# Patient Record
Sex: FEMALE | Race: WHITE | Employment: UNEMPLOYED
[De-identification: names, ages, dates, MRNs, and addresses within clinical notes are randomized per-mention and may not be internally consistent; named-entity substitution may affect disease eponyms.]

---

## 2017-06-03 ENCOUNTER — HEALTH MAINTENANCE LETTER (OUTPATIENT)
Age: 32
End: 2017-06-03

## 2018-04-30 ENCOUNTER — OFFICE VISIT (OUTPATIENT)
Dept: FAMILY MEDICINE | Facility: CLINIC | Age: 33
End: 2018-04-30
Payer: COMMERCIAL

## 2018-04-30 VITALS
HEART RATE: 44 BPM | SYSTOLIC BLOOD PRESSURE: 110 MMHG | DIASTOLIC BLOOD PRESSURE: 69 MMHG | WEIGHT: 124 LBS | RESPIRATION RATE: 18 BRPM | TEMPERATURE: 96.7 F | BODY MASS INDEX: 21.28 KG/M2

## 2018-04-30 DIAGNOSIS — G44.219 EPISODIC TENSION-TYPE HEADACHE, NOT INTRACTABLE: Primary | ICD-10-CM

## 2018-04-30 PROCEDURE — 99213 OFFICE O/P EST LOW 20 MIN: CPT | Performed by: NURSE PRACTITIONER

## 2018-04-30 RX ORDER — SUMATRIPTAN 50 MG/1
50 TABLET, FILM COATED ORAL
Qty: 9 TABLET | Refills: 1 | Status: SHIPPED | OUTPATIENT
Start: 2018-04-30 | End: 2018-07-15

## 2018-04-30 NOTE — PROGRESS NOTES
SUBJECTIVE:   Damari Villalta is a 32 year old female who presents to clinic today for the following health issues:        Headaches      Duration: on going for years     Description  Location: bilateral in the frontal area   Character: throbbing pain      Intensity:  severe    Accompanying signs and symptoms:    Precipitating or Alleviating factors:  Nausea/vomiting: usually  Dizziness: no  Weakness or numbness: no  Visual changes: none  Fever: no   Sinus or URI symptoms no     History  Head trauma: no  Family history of migraines: no  Previous tests for headaches: no  Neurologist evaluations: no   Able to do daily activities when headache present: YES  Wake with headaches: YES  Daily pain medication use: no    Usually has 3-4 episodes of headaches per month.    Most last between 1-3 days a few were up to 7 days.    Rates pain +7/10 and usually can work through the pain but recently has not been able to work.    Feels squeezing in the front.   Sometimes ibuprofen helps.   Tried fioricet in the past did not help at all.   Has a high deductible insurance plan.  Want to avoid seeing a specialist if at all possible.       Problem list and histories reviewed & adjusted, as indicated.  Additional history: as documented    Reviewed and updated as needed this visit by clinical staff  Tobacco  Allergies  Meds  Problems  Med Hx  Surg Hx  Fam Hx  Soc Hx        Reviewed and updated as needed this visit by Provider  Tobacco  Allergies  Meds  Problems  Med Hx  Surg Hx  Fam Hx  Soc Hx          ROS:  Constitutional, HEENT, cardiovascular, pulmonary, gi and gu systems are negative, except as otherwise noted.    OBJECTIVE:     /69  Pulse (!) 44  Temp 96.7  F (35.9  C) (Oral)  Resp 18  Wt 124 lb (56.2 kg)  BMI 21.28 kg/m2  Body mass index is 21.28 kg/(m^2).  GENERAL: healthy, alert and no distress  EYES: Eyes grossly normal to inspection, PERRL and conjunctivae and sclerae normal  HENT: ear canals and  TM's normal, nose and mouth without ulcers or lesions  NECK: no adenopathy, no asymmetry, masses, or scars and thyroid normal to palpation  RESP: lungs clear to auscultation - no rales, rhonchi or wheezes  CV: regular rate and rhythm, normal S1 S2, no S3 or S4, no murmur, click or rub, no peripheral edema and peripheral pulses strong  MS: no gross musculoskeletal defects noted, no edema  NEURO: Normal strength and tone, sensory exam grossly normal, mentation intact and cranial nerves 2-12 intact      ASSESSMENT/PLAN:       ICD-10-CM    1. Episodic tension-type headache, not intractable G44.219 SUMAtriptan (IMITREX) 50 MG tablet     Discussed  headaches, triggers, and various treatments, including immediate acting, mainly the triptans, and prophylaxis and indications for each. Gone over benefits and risks, as well as side effects of medications, as well as instructions. Patient education handout given.  F/u if persists or worsens.  May consider neuro referral.      See Patient Instructions    BAYLEE Willard CNP  [facility]

## 2018-04-30 NOTE — Clinical Note
Please abstract the following data from this visit with this patient into the appropriate field in Epic:  Pap smear done on this date: 2015 (approximately), by this group: [facility] , results were normal.

## 2018-04-30 NOTE — MR AVS SNAPSHOT
After Visit Summary   4/30/2018    Damari DE LOS SANTOS Metzdorff    MRN: [id number]           Patient Information     Date Of Birth          1985        Visit Information        Provider Department      4/30/2018 10:40 AM Lydia Maurice APRN CNP [facility]        Today's Diagnoses     Episodic tension-type headache, not intractable    -  1       Follow-ups after your visit        Who to contact     If you have questions or need follow up information about today's clinic visit or your schedule please contact [facility] directly at [phone].  Normal or non-critical lab and imaging results will be communicated to you by OneMobhart, letter or phone within 4 business days after the clinic has received the results. If you do not hear from us within 7 days, please contact the clinic through OneMobhart or phone. If you have a critical or abnormal lab result, we will notify you by phone as soon as possible.  Submit refill requests through TotalTakeout or call your pharmacy and they will forward the refill request to us. Please allow 3 business days for your refill to be completed.          Additional Information About Your Visit        MyChart Information     TotalTakeout gives you secure access to your electronic health record. If you see a primary care provider, you can also send messages to your care team and make appointments. If you have questions, please call your primary care clinic.  If you do not have a primary care provider, please call [phone] and they will assist you.        Care EveryWhere ID     This is your Care EveryWhere ID. This could be used by other organizations to access your [facility] medical records  [id number]        Your Vitals Were     Pulse Temperature Respirations BMI (Body Mass Index)          44 96.7  F (35.9  C) (Oral) 18 21.28 kg/m2         Blood Pressure from Last 3 Encounters:   04/30/18 110/69   04/07/15 110/66   08/20/14 100/63    Weight  from Last 3 Encounters:   04/30/18 124 lb (56.2 kg)   04/07/15 131 lb (59.4 kg)   08/20/14 128 lb 6.4 oz (58.2 kg)              Today, you had the following     No orders found for display         Today's Medication Changes          These changes are accurate as of 4/30/18 11:04 AM.  If you have any questions, ask your nurse or doctor.               Start taking these medicines.        Dose/Directions    SUMAtriptan 50 MG tablet   Commonly known as:  IMITREX   Used for:  Episodic tension-type headache, not intractable   Started by:  Lydia Maurice, APRN CNP        Dose:  50 mg   Take 1 tablet (50 mg) by mouth at onset of headache for migraine May repeat in 2 hours. Max 4 tablets/24 hours.   Quantity:  9 tablet   Refills:  1            Where to get your medicines      These medications were sent to [facility] - [place], [place] - [address]  [address], [place] [address]     Phone:  [phone]     SUMAtriptan 50 MG tablet                Primary Care Provider Office Phone # Fax #    BAYLEE Willard [phone]       [address] [place] [address]        Equal Access to Services     CADEN BORJAS AH: Connie paintero Soluliali, waaxda luqadaha, qaybta kaalmada adeegyada, shaina mancini. So [facility] [phone].    ATENCIÓN: Si habla español, tiene a lindsay disposición servicios gratuitos de asistencia lingüística. Llame al [phone].    We comply with applicable federal civil rights laws and [place] laws. We do not discriminate on the basis of race, color, national origin, age, disability, sex, sexual orientation, or gender identity.            Thank you!     Thank you for choosing [facility]  for your care. Our goal is always to provide you with excellent care. Hearing back from our patients is one way we can continue to improve our services. Please take a few minutes to complete the written survey  that you may receive in the mail after your visit with us. Thank you!             Your Updated Medication List - Protect others around you: Learn how to safely use, store and throw away your medicines at www.disposemymeds.org.          This list is accurate as of 4/30/18 11:04 AM.  Always use your most recent med list.                   Brand Name Dispense Instructions for use Diagnosis    butalbital-acetaminophen-caffeine -40 MG per tablet    FIORICET/ESGIC    28 tablet    Take 1 tablet by mouth every 4 hours as needed    Headache(784.0)       desogestrel-ethinyl estradiol 0.15-30 MG-MCG per tablet    APRI    3 Package    Take 1 tablet by mouth daily    Contraception management       SUMAtriptan 50 MG tablet    IMITREX    9 tablet    Take 1 tablet (50 mg) by mouth at onset of headache for migraine May repeat in 2 hours. Max 4 tablets/24 hours.    Episodic tension-type headache, not intractable

## 2018-05-05 ENCOUNTER — HEALTH MAINTENANCE LETTER (OUTPATIENT)
Age: 33
End: 2018-05-05

## 2018-07-15 ENCOUNTER — MYC REFILL (OUTPATIENT)
Dept: FAMILY MEDICINE | Facility: CLINIC | Age: 33
End: 2018-07-15

## 2018-07-15 DIAGNOSIS — G44.219 EPISODIC TENSION-TYPE HEADACHE, NOT INTRACTABLE: ICD-10-CM

## 2018-07-17 RX ORDER — SUMATRIPTAN 50 MG/1
50 TABLET, FILM COATED ORAL
Qty: 9 TABLET | Refills: 1 | Status: SHIPPED | OUTPATIENT
Start: 2018-07-17 | End: 2018-09-27

## 2018-07-17 NOTE — TELEPHONE ENCOUNTER
Message from Tytot:  Original authorizing provider: BAYLEE Willard CNP would like a refill of the following medications:  SUMAtriptan (IMITREX) 50 MG tablet [BAYLEE Willard CNP]    Preferred pharmacy: [facility] - [place], [place] - [address]    Comment:  I hoping to get a renewal on the Sumatriptan prescription. It has worked very well. I have been able to keep the headaches away for the most part or they only are mild. I haven't had to miss hours of work to recover and the nausea doesn't rear it's head.

## 2018-09-11 ENCOUNTER — OFFICE VISIT (OUTPATIENT)
Dept: FAMILY MEDICINE | Facility: CLINIC | Age: 33
End: 2018-09-11
Payer: COMMERCIAL

## 2018-09-11 VITALS
OXYGEN SATURATION: 99 % | BODY MASS INDEX: 21.77 KG/M2 | TEMPERATURE: 97.4 F | RESPIRATION RATE: 16 BRPM | DIASTOLIC BLOOD PRESSURE: 71 MMHG | WEIGHT: 126.8 LBS | SYSTOLIC BLOOD PRESSURE: 114 MMHG | HEART RATE: 45 BPM

## 2018-09-11 DIAGNOSIS — R51.9 CHRONIC DAILY HEADACHE: Primary | ICD-10-CM

## 2018-09-11 PROCEDURE — 99214 OFFICE O/P EST MOD 30 MIN: CPT | Performed by: FAMILY MEDICINE

## 2018-09-11 RX ORDER — CYCLOBENZAPRINE HCL 10 MG
5-10 TABLET ORAL 3 TIMES DAILY PRN
Qty: 15 TABLET | Refills: 1 | Status: SHIPPED | OUTPATIENT
Start: 2018-09-11 | End: 2019-01-31

## 2018-09-11 RX ORDER — FLURBIPROFEN 50 MG
50 TABLET ORAL 2 TIMES DAILY
Qty: 28 TABLET | Refills: 0 | Status: SHIPPED | OUTPATIENT
Start: 2018-09-11 | End: 2019-01-31

## 2018-09-11 NOTE — PROGRESS NOTES
"  SUBJECTIVE:   Damari Villalta is a 32 year old female who presents to clinic today for the following health issues:        Headaches      Duration: saturday    Description  Location: bilateral in the frontal area   Character: sharp pain  Frequency:  1 to 2 times a week  Duration:  All day    Intensity:  severe    Accompanying signs and symptoms:    Precipitating or Alleviating factors:  Nausea/vomiting: occasionally  Dizziness: occasionally  Weakness or numbness: occasionally  Visual changes: none  Fever: no   Sinus or URI symptoms no     History  Head trauma: no   Family history of migraines: YES      Usually, pain is bilateral in her forehead and around her eyes, but today the pain is more on the left side.  She does not know how to characterize the pain.  She was prescribed imitrex at her last PCP visit a few months ago, and it has been a \"wonder drug\" for her, at least initially.  Now, it seems to be helping less and did not help stop this headache, which is ongong for the past 4 days.  She also takes ibuprofen for her headaches.  She takes something for headaches more than a couple of times per week.  She denies any allergy or sinus symptoms.  She denies any aura.  She usually is able to work through a headache, but sometimes does have to go and lie down.  Sleeping does not necessarily help.  Pain does not wake her up from sleep. No fevers, chills, weight loss, neck stiffness, rash, numbness, tingling, weakness or speech changes.  Typical triggers include dehydration, alcohol, skipping a meal, not enough sleep, too much stress, neck and shoulder tension.  She runs for exercise; running does not cause pain, but if she gets dehydrated, it can trigger a headache.  Overall healthy lifestyle.  She did try stopping her OCP, but this did not change headaches.   In the past, fioricet has not helped headaches.          Problem list and histories reviewed & adjusted, as indicated.  Additional history: as " documented    Patient Active Problem List   Diagnosis     CARDIOVASCULAR SCREENING; LDL GOAL LESS THAN 160     Encounter for other general counseling or advice on contraception     Past Surgical History:   Procedure Laterality Date     TONSILLECTOMY & ADENOIDECTOMY         Social History   Substance Use Topics     Smoking status: Never Smoker     Smokeless tobacco: Never Used     Alcohol use Yes      Comment: social, 3 drinks weekly     Family History   Problem Relation Age of Onset     Hypertension Maternal Grandmother      Hypertension Maternal Grandfather      Hypertension Paternal Grandmother      Hypertension Paternal Grandfather          Current Outpatient Prescriptions   Medication Sig Dispense Refill     cyclobenzaprine (FLEXERIL) 10 MG tablet Take 0.5-1 tablets (5-10 mg) by mouth 3 times daily as needed for muscle spasms 15 tablet 1     flurbiprofen (ANSAID) 50 MG tablet Take 1 tablet (50 mg) by mouth 2 times daily for 14 days 28 tablet 0     SUMAtriptan (IMITREX) 50 MG tablet Take 1 tablet (50 mg) by mouth at onset of headache for migraine May repeat in 2 hours. Max 4 tablets/24 hours. 9 tablet 1     Allergies   Allergen Reactions     Nkda [No Known Drug Allergies]        Reviewed and updated as needed this visit by clinical staff       Reviewed and updated as needed this visit by Provider         ROS:  Constitutional, HEENT, cardiovascular, pulmonary, GI, , musculoskeletal, neuro, skin, endocrine and psych systems are negative, except as otherwise noted.    OBJECTIVE:     /71  Pulse (!) 45  Temp 97.4  F (36.3  C) (Oral)  Resp 16  Wt 126 lb 12.8 oz (57.5 kg)  SpO2 99%  BMI 21.77 kg/m2  Body mass index is 21.77 kg/(m^2).  GENERAL APPEARANCE: healthy, alert and no distress  EYES: Eyes grossly normal to inspection, PERRL and conjunctivae and sclerae normal  HENT: ear canals and TM's normal and nose and mouth without ulcers or lesions  NECK: no adenopathy, no asymmetry, masses, or scars and  "thyroid normal to palpation  RESP: lungs clear to auscultation - no rales, rhonchi or wheezes  CV: regular rates and rhythm, normal S1 S2, no S3 or S4 and no murmur, click or rub  NEURO: Normal strength and tone, mentation intact and speech normal  CN II: visual fields intact to confrontation  CN III, IV, VI: EOM intact, pupils equal and reactive  CN V: facial sensation nl  CN VII: face symmetric, no facial droop  CN VIII: hearing normal to conversation  CN IX: palate elevation symmetric, uvula at midline  CN XI SCM normal, shoulder shrug nl  CN XII: tongue midline  Normal gait  DTRs symmetric  Normal finger-to-nose  Negative Rhomberg.  PSYCH: mentation appears normal and affect normal/bright        ASSESSMENT/PLAN:             1. Chronic daily headache  I think there is likely a component of rebound headache that has caused a worsening of her symptoms.  I have recommended to take a long-acting anti-inflammatory, and to use flexeril and/or the imitrex if an acute headache begins.  Advised to stay well-hydrated, get regular sleep and take steps to manage stress.  If headaches are not improving, would consider neuroimaging and referral to neurology.  Discussed to limit use of imitrex and over the counter pain relievers to no more than twice per week.  She has such frequent headaches, she may consider a preventative medication (obviously not a BB or CCB given low resting pulse due to athleticism), but I am not suggesting she do this at this time.  Her neurologic exam today is nonfocal, and there are no \"red flag\" symptoms that I think warrant urgent imaging.    - flurbiprofen (ANSAID) 50 MG tablet; Take 1 tablet (50 mg) by mouth 2 times daily for 14 days  Dispense: 28 tablet; Refill: 0  - cyclobenzaprine (FLEXERIL) 10 MG tablet; Take 0.5-1 tablets (5-10 mg) by mouth 3 times daily as needed for muscle spasms  Dispense: 15 tablet; Refill: 1    F/u after 2 weeks of treatment to discuss taper vs stopping flurbiprofen, " sooner if needed.      Josette Alvarez MD  [facility]

## 2018-09-11 NOTE — MR AVS SNAPSHOT
After Visit Summary   9/11/2018    Damari DE LOS SANTOS Metzdorff    MRN: [id number]           Patient Information     Date Of Birth          1985        Visit Information        Provider Department      9/11/2018 9:00 AM Josette Alvarez MD [facility]        Today's Diagnoses     Chronic daily headache    -  1      Care Instructions    Start flurbiprofen twice daily with food for 2 weeks.  Let us know how you do.  If you get a headache, take your sumatriptan plus or minus flexeril.               Follow-ups after your visit        Who to contact     If you have questions or need follow up information about today's clinic visit or your schedule please contact [facility] directly at [phone].  Normal or non-critical lab and imaging results will be communicated to you by [employer] Grouphart, letter or phone within 4 business days after the clinic has received the results. If you do not hear from us within 7 days, please contact the clinic through [employer] or phone. If you have a critical or abnormal lab result, we will notify you by phone as soon as possible.  Submit refill requests through [employer] Snowboarding or call your pharmacy and they will forward the refill request to us. Please allow 3 business days for your refill to be completed.          Additional Information About Your Visit        MyChart Information     [employer] gives you secure access to your electronic health record. If you see a primary care provider, you can also send messages to your care team and make appointments. If you have questions, please call your primary care clinic.  If you do not have a primary care provider, please call [phone] and they will assist you.        Care EveryWhere ID     This is your Care EveryWhere ID. This could be used by other organizations to access your [facility] medical records  [id number]        Your Vitals Were     Pulse Temperature Respirations Pulse Oximetry BMI (Body Mass Index)        45 97.4  F (36.3  C) (Oral) 16 99% 21.77 kg/m2        Blood Pressure from Last 3 Encounters:   09/11/18 114/71   04/30/18 110/69   04/07/15 110/66    Weight from Last 3 Encounters:   09/11/18 126 lb 12.8 oz (57.5 kg)   04/30/18 124 lb (56.2 kg)   04/07/15 131 lb (59.4 kg)              Today, you had the following     No orders found for display         Today's Medication Changes          These changes are accurate as of 9/11/18  9:24 AM.  If you have any questions, ask your nurse or doctor.               Start taking these medicines.        Dose/Directions    cyclobenzaprine 10 MG tablet   Commonly known as:  FLEXERIL   Used for:  Chronic daily headache   Started by:  Josette Alvarez MD        Dose:  5-10 mg   Take 0.5-1 tablets (5-10 mg) by mouth 3 times daily as needed for muscle spasms   Quantity:  15 tablet   Refills:  1       flurbiprofen 50 MG tablet   Commonly known as:  ANSAID   Used for:  Chronic daily headache   Started by:  Josette Alvarez MD        Dose:  50 mg   Take 1 tablet (50 mg) by mouth 2 times daily for 14 days   Quantity:  28 tablet   Refills:  0            Where to get your medicines      These medications were sent to [facility] - [place], [place] - [address]  [address], [place] [address]     Phone:  [phone]     cyclobenzaprine 10 MG tablet    flurbiprofen 50 MG tablet                Primary Care Provider Office Phone # Fax #    BAYLEE Willard [facility] [phone]       [address] [place] [address]        Equal Access to Services     BIBI BORJAS AH: Hadashli Ferro, gregory luoralia, qaybta kaalmada yong, shaina mancini. So [facility] [phone].    ATENCIÓN: Si habla español, tiene a lindsay disposición servicios gratuitos de asistencia lingüística. Llame al [phone].    We comply with applicable federal civil rights laws and [place] laws. We do not discriminate on the  basis of race, color, national origin, age, disability, sex, sexual orientation, or gender identity.            Thank you!     Thank you for choosing [facility]  for your care. Our goal is always to provide you with excellent care. Hearing back from our patients is one way we can continue to improve our services. Please take a few minutes to complete the written survey that you may receive in the mail after your visit with us. Thank you!             Your Updated Medication List - Protect others around you: Learn how to safely use, store and throw away your medicines at www.disposemymeds.org.          This list is accurate as of 9/11/18  9:24 AM.  Always use your most recent med list.                   Brand Name Dispense Instructions for use Diagnosis    cyclobenzaprine 10 MG tablet    FLEXERIL    15 tablet    Take 0.5-1 tablets (5-10 mg) by mouth 3 times daily as needed for muscle spasms    Chronic daily headache       flurbiprofen 50 MG tablet    ANSAID    28 tablet    Take 1 tablet (50 mg) by mouth 2 times daily for 14 days    Chronic daily headache       SUMAtriptan 50 MG tablet    IMITREX    9 tablet    Take 1 tablet (50 mg) by mouth at onset of headache for migraine May repeat in 2 hours. Max 4 tablets/24 hours.    Episodic tension-type headache, not intractable

## 2018-09-11 NOTE — PATIENT INSTRUCTIONS
Start flurbiprofen twice daily with food for 2 weeks.  Let us know how you do.  If you get a headache, take your sumatriptan plus or minus flexeril.

## 2018-09-27 ENCOUNTER — MYC REFILL (OUTPATIENT)
Dept: FAMILY MEDICINE | Facility: CLINIC | Age: 33
End: 2018-09-27

## 2018-09-27 DIAGNOSIS — G44.219 EPISODIC TENSION-TYPE HEADACHE, NOT INTRACTABLE: ICD-10-CM

## 2018-09-27 RX ORDER — SUMATRIPTAN 50 MG/1
50 TABLET, FILM COATED ORAL
Qty: 9 TABLET | Refills: 1 | Status: SHIPPED | OUTPATIENT
Start: 2018-09-27 | End: 2018-11-17

## 2018-09-27 NOTE — TELEPHONE ENCOUNTER
Message from Atonometricst:  Original authorizing provider: BAYLEE Willard CNP MAGALIAnabella Villalta would like a refill of the following medications:  SUMAtriptan (IMITREX) 50 MG tablet [BAYLEE Willard CNP]    Preferred pharmacy: [facility] - [place], [place] - [address]    Comment:  I hoping to get a renewal on Imitrex, it has been the best at keeping away the worst headaches. One exception since late April, 9/10-9/11 I was miserable and Imitrex wasn't helping. I saw Josette Alvarez and since I often have Ibuprofen she suggested I try a 2 week round of flurbiprophen in case it is rebound headaches. I haven't had Ibuprofen since and have done the round of FB but have had to take Imitrex twice to fight off a headache and am now out. Thank you for your time and help.

## 2018-11-17 ENCOUNTER — MYC REFILL (OUTPATIENT)
Dept: FAMILY MEDICINE | Facility: CLINIC | Age: 33
End: 2018-11-17

## 2018-11-17 DIAGNOSIS — G44.219 EPISODIC TENSION-TYPE HEADACHE, NOT INTRACTABLE: ICD-10-CM

## 2018-11-17 NOTE — TELEPHONE ENCOUNTER
Message from Vinet:  Original authorizing provider: BAYLEE Willard CNP would like a refill of the following medications:  SUMAtriptan (IMITREX) 50 MG tablet [BAYLEE Willard CNP]    Preferred pharmacy: [facility] - [place], [place] - [address]    Comment:  I hoping to get a renewal on the Sumatriptan prescription. It has worked very well.

## 2018-11-20 ENCOUNTER — MYC REFILL (OUTPATIENT)
Dept: FAMILY MEDICINE | Facility: CLINIC | Age: 33
End: 2018-11-20

## 2018-11-20 DIAGNOSIS — G44.219 EPISODIC TENSION-TYPE HEADACHE, NOT INTRACTABLE: ICD-10-CM

## 2018-11-20 RX ORDER — SUMATRIPTAN 50 MG/1
50 TABLET, FILM COATED ORAL
Qty: 9 TABLET | Refills: 1 | Status: CANCELLED | OUTPATIENT
Start: 2018-11-20

## 2018-11-20 RX ORDER — SUMATRIPTAN 50 MG/1
50 TABLET, FILM COATED ORAL
Qty: 9 TABLET | Refills: 1 | Status: SHIPPED | OUTPATIENT
Start: 2018-11-20 | End: 2018-12-27

## 2018-11-20 NOTE — TELEPHONE ENCOUNTER
Message from SitScapet:  Original authorizing provider: BAYLEE Willard CNP would like a refill of the following medications:  SUMAtriptan (IMITREX) 50 MG tablet [BAYLEE Willard CNP]    Preferred pharmacy: [facility] - [place], [place] - [address]    Comment:  I thought I submitted a request to refill this on Saturday but I am not unsure since I haven't heard back so I am trying again. Thanks for your time! Radha

## 2019-01-11 ENCOUNTER — OFFICE VISIT (OUTPATIENT)
Dept: FAMILY MEDICINE | Facility: CLINIC | Age: 34
End: 2019-01-11
Payer: COMMERCIAL

## 2019-01-11 VITALS
OXYGEN SATURATION: 100 % | DIASTOLIC BLOOD PRESSURE: 72 MMHG | RESPIRATION RATE: 16 BRPM | SYSTOLIC BLOOD PRESSURE: 99 MMHG | TEMPERATURE: 98.5 F | WEIGHT: 125 LBS | HEART RATE: 45 BPM | BODY MASS INDEX: 21.46 KG/M2

## 2019-01-11 DIAGNOSIS — R51.9 CHRONIC DAILY HEADACHE: ICD-10-CM

## 2019-01-11 DIAGNOSIS — Z01.419 WELL FEMALE EXAM WITH ROUTINE GYNECOLOGICAL EXAM: Primary | ICD-10-CM

## 2019-01-11 PROCEDURE — G0145 SCR C/V CYTO,THINLAYER,RESCR: HCPCS | Performed by: NURSE PRACTITIONER

## 2019-01-11 PROCEDURE — G0124 SCREEN C/V THIN LAYER BY MD: HCPCS | Performed by: NURSE PRACTITIONER

## 2019-01-11 PROCEDURE — 90471 IMMUNIZATION ADMIN: CPT | Performed by: NURSE PRACTITIONER

## 2019-01-11 PROCEDURE — 99395 PREV VISIT EST AGE 18-39: CPT | Mod: 25 | Performed by: NURSE PRACTITIONER

## 2019-01-11 PROCEDURE — 87624 HPV HI-RISK TYP POOLED RSLT: CPT | Performed by: NURSE PRACTITIONER

## 2019-01-11 PROCEDURE — 90715 TDAP VACCINE 7 YRS/> IM: CPT | Performed by: NURSE PRACTITIONER

## 2019-01-11 ASSESSMENT — ENCOUNTER SYMPTOMS
PARESTHESIAS: 0
ARTHRALGIAS: 0
NERVOUS/ANXIOUS: 0
MYALGIAS: 0
ABDOMINAL PAIN: 0
SHORTNESS OF BREATH: 0
HEMATURIA: 0
DYSURIA: 0
PALPITATIONS: 0
FREQUENCY: 0
HEMATOCHEZIA: 0
DIZZINESS: 0
SORE THROAT: 0
COUGH: 0
FEVER: 0
WEAKNESS: 0
EYE PAIN: 0
BREAST MASS: 0
HEARTBURN: 0
DIARRHEA: 0
NAUSEA: 0
CHILLS: 0
HEADACHES: 1
JOINT SWELLING: 0

## 2019-01-11 NOTE — PROGRESS NOTES
SUBJECTIVE:   CC: Damari Villalta is an 33 year old woman who presents for preventive health visit.     Physical   Annual:     Getting at least 3 servings of Calcium per day:  Yes    Bi-annual eye exam:  NO    Dental care twice a year:  NO    Sleep apnea or symptoms of sleep apnea:  None    Diet:  Regular (no restrictions)    Frequency of exercise:  4-5 days/week    Duration of exercise:  30-45 minutes    Taking medications regularly:  Yes    Medication side effects:  Not applicable    Additional concerns today:  Yes    PHQ-2 Total Score: 0      Today's PHQ-2 Score:   PHQ-2 ( 1999 [employer]) 1/11/2019   Q1: Little interest or pleasure in doing things 0   Q2: Feeling down, depressed or hopeless 0   PHQ-2 Score 0   Q1: Little interest or pleasure in doing things Not at all   Q2: Feeling down, depressed or hopeless Not at all   PHQ-2 Score 0       Abuse: Current or Past(Physical, Sexual or Emotional)- No  Do you feel safe in your environment? Yes    Social History     Tobacco Use     Smoking status: Never Smoker     Smokeless tobacco: Never Used   Substance Use Topics     Alcohol use: Yes     Comment: social, 3 drinks weekly     Alcohol Use 1/11/2019   If you drink alcohol do you typically have greater than 3 drinks per day OR greater than 7 drinks per week? No     Reviewed orders with patient.  Reviewed health maintenance and updated orders accordingly - Yes      Pertinent mammograms are reviewed under the imaging tab.  History of abnormal Pap smear: NO - age 30-65 PAP every 5 years with negative HPV co-testing recommended  PAP / HPV 7/24/2013   PAP NIL     Reviewed and updated as needed this visit by clinical staff         Reviewed and updated as needed this visit by Provider          Review of Systems   Constitutional: Negative for chills and fever.   HENT: Negative for congestion, ear pain, hearing loss and sore throat.    Eyes: Negative for pain and visual disturbance.   Respiratory: Negative for cough and  shortness of breath.    Cardiovascular: Negative for chest pain, palpitations and peripheral edema.   Gastrointestinal: Negative for abdominal pain, diarrhea, heartburn, hematochezia and nausea.   Breasts:  Negative for tenderness, breast mass and discharge.   Genitourinary: Negative for dysuria, frequency, genital sores, hematuria, pelvic pain, urgency, vaginal bleeding and vaginal discharge.   Musculoskeletal: Negative for arthralgias, joint swelling and myalgias.   Skin: Negative for rash.   Neurological: Positive for headaches. Negative for dizziness, weakness and paresthesias.   Psychiatric/Behavioral: Negative for mood changes. The patient is not nervous/anxious.        OBJECTIVE:   BP 99/72 (BP Location: Left arm, Patient Position: Sitting, Cuff Size: Adult Regular)   Pulse (!) 45   Temp 98.5  F (36.9  C) (Oral)   Resp 16   Wt 56.7 kg (125 lb)   LMP 12/21/2018 (Approximate)   SpO2 100%   BMI 21.46 kg/m    Physical Exam  GENERAL: healthy, alert and no distress  EYES: Eyes grossly normal to inspection, PERRL and conjunctivae and sclerae normal  HENT: ear canals and TM's normal, nose and mouth without ulcers or lesions  NECK: no adenopathy, no asymmetry, masses, or scars and thyroid normal to palpation  RESP: lungs clear to auscultation - no rales, rhonchi or wheezes  BREAST: normal without masses, tenderness or nipple discharge and no palpable axillary masses or adenopathy  CV: regular rate and rhythm, normal S1 S2, no S3 or S4, no murmur, click or rub, no peripheral edema and peripheral pulses strong  ABDOMEN: soft, nontender, no hepatosplenomegaly, no masses and bowel sounds normal   (female): normal female external genitalia, normal urethral meatus, vaginal mucosa pink, moist, well rugated, and normal cervix/adnexa/uterus without masses or discharge  MS: no gross musculoskeletal defects noted, no edema  SKIN: no suspicious lesions or rashes  NEURO: Normal strength and tone, mentation intact and  "speech normal  PSYCH: mentation appears normal, affect normal/bright      ASSESSMENT/PLAN:       ICD-10-CM    1. Well female exam with routine gynecological exam Z01.419 Pap imaged thin layer screen with HPV - recommended age 30 - 65 years (select HPV order below)     HPV High Risk Types DNA Cervical   2. Chronic daily headache R51 NEUROLOGY ADULT REFERRAL      well female, not fasting for labs.  Encouraged to continue exercise and healthy diet choices.      Almost daily headaches.    Tried muscle relaxant and fluribiprofen with no relief.    imitrex helps but is taking it 3-4 times a week.    Will refer to neurology for further eval of recurrent and increasing headaches.      COUNSELING:  Reviewed preventive health counseling, as reflected in patient instructions    BP Readings from Last 1 Encounters:   09/11/18 114/71     Estimated body mass index is 21.77 kg/m  as calculated from the following:    Height as of 4/7/15: 1.626 m (5' 4\").    Weight as of 9/11/18: 57.5 kg (126 lb 12.8 oz).         reports that  has never smoked. she has never used smokeless tobacco.      Counseling Resources:  ATP IV Guidelines  Pooled Cohorts Equation Calculator  Breast Cancer Risk Calculator  FRAX Risk Assessment  ICSI Preventive Guidelines  Dietary Guidelines for Americans, 2010  USDA's MyPlate  ASA Prophylaxis  Lung CA Screening    BAYLEE Willard CNP  [facility]  "

## 2019-01-11 NOTE — NURSING NOTE
Screening Questionnaire for Adult Immunization    Are you sick today?   No   Do you have allergies to medications, food, a vaccine component or latex?   No   Have you ever had a serious reaction after receiving a vaccination?   No   Do you have a long-term health problem with heart disease, lung disease, asthma, kidney disease, metabolic disease (e.g. diabetes), anemia, or other blood disorder?   No   Do you have cancer, leukemia, HIV/AIDS, or any other immune system problem?   No   In the past 3 months, have you taken medications that affect  your immune system, such as prednisone, other steroids, or anticancer drugs; drugs for the treatment of rheumatoid arthritis, Crohn s disease, or psoriasis; or have you had radiation treatments?   No   Have you had a seizure, or a brain or other nervous system problem?   No   During the past year, have you received a transfusion of blood or blood     products, or been given immune (gamma) globulin or antiviral drug?   No   For women: Are you pregnant or is there a chance you could become        pregnant during the next month?   No   Have you received any vaccinations in the past 4 weeks?   No     Immunization questionnaire answers were all negative.        Per orders of Dr. Maurice, injection of Adacel given by Gabriella Brar. Patient instructed to remain in clinic for 15 minutes afterwards, and to report any adverse reaction to me immediately.       Screening performed by Gabriella Brar on 1/11/2019 at 1:48 PM.

## 2019-01-15 LAB
COPATH REPORT: ABNORMAL
PAP: ABNORMAL

## 2019-01-17 PROBLEM — R87.610 ASCUS OF CERVIX WITH NEGATIVE HIGH RISK HPV: Status: ACTIVE | Noted: 2019-01-11

## 2019-01-17 LAB
FINAL DIAGNOSIS: NORMAL
HPV HR 12 DNA CVX QL NAA+PROBE: NEGATIVE
HPV16 DNA SPEC QL NAA+PROBE: NEGATIVE
HPV18 DNA SPEC QL NAA+PROBE: NEGATIVE
SPECIMEN DESCRIPTION: NORMAL
SPECIMEN SOURCE CVX/VAG CYTO: NORMAL

## 2019-01-30 NOTE — PROGRESS NOTES
INITIAL NEUROLOGY CONSULTATION    DATE OF VISIT: 1/31/2019  CLINIC LOCATION: [facility]  MRN: [id number]  PATIENT NAME: Damari Villalta  YOB: 1985    PRIMARY CARE PROVIDER: BAYLEE Willard CNP     REASON FOR VISIT:   Chief Complaint   Patient presents with     Headache     headaches for years, frequency increasing       HISTORY OF PRESENT ILLNESS:                                                    Ms. Damari Villalta is 33 year old right handed female patient without significant past medical history, who was seen in consultation today requested by BAYLEE Willard CNP, for chronic headache management.    Per patient's report, she has long-standing history of rare headaches since her high school that did not significantly change while she was in college.  Then, they increased in frequency to 1-2/month 5-6 years ago, and increased further to one or more per week for the last 1.5 years.  No perceivable change in other headache characteristics.    Intermittent pressure 6-7/10 headache is located in bifrontal areas (occasionally bioccipital regions) and could last up to 2 days.  It used to occurred recently up to 3-4 times per week.  Occasionally it could increase up to 9/10 and become throbbing.  At that point, she would also experience light and sound sensitivity, generalized weakness, nausea, and occasionally emesis.  No physical intolerance.  No auras or focal neurological symptoms.  She would find cold compress or walking helpful, but not necessarily need to be in a dark quiet room (does not make a significant difference).    3-4 years ago the patient went off birth control, but it did not produce any noticeable improvement.  Tried low in nitrates and tyramine diet, lots of sleep, and no alcohol without success.  Fioricet was not helpful.  Tried Flexeril and flurbiprofen (September 2018 for 2 weeks) without relief.  Feels that Imitrex is helpful, but used  to take it 3-4 times per week along with ibuprofen.  Since 1/1/2019 does not take any ibuprofen or Imitrex.  First 17 days she suffered through a few 2-3 day headaches, but since January 18, she did not have any headaches (which is a great improvement).  Was told by her primary care provider about magnesium and vitamin B2 for headache prevention, but did not take them consistently in the past.    The patient reports 8-9 hours of restful night sleep.  She eats regular meals and drinks on average 6 glasses of fluids per day, including 2 cups of coffee.  No other caffeinated beverages.  She rates her stress level as moderate.    No recent pertinent labs.  No prior brain or spine imaging.  No additional useful information is available in Care Everywhere, which was reviewed.    The patient denies a history of recent head injury. Prior neurological history: negative for stroke, brain neoplasms, seizure disorders, multiple sclerosis, meningitis, encephalitis, and major head injuries.    Neurologic Review of Systems - no amaurosis, diplopia, abnormal speech, unilateral numbness or weakness. She endorses anxiety, depression, restlessness, and memory problems (all minor, she previously did not seek medical attention regarding these, but planning to do so if they become more prominent in the future). Otherwise, she denies any other complaints on 14-point comprehensive review of systems.  PAST MEDICAL/SURGICAL HISTORY:                                                    I personally reviewed patient's past medical and surgical history with the patient at today's visit.  Patient Active Problem List   Diagnosis     CARDIOVASCULAR SCREENING; LDL GOAL LESS THAN 160     Encounter for other general counseling or advice on contraception     ASCUS of cervix with negative high risk HPV     Past Medical History:   Diagnosis Date     Abnormal Pap smear of cervix 01/11/2019 01/11/19: See problem list.      Past Surgical History:  "  Procedure Laterality Date     TONSILLECTOMY & ADENOIDECTOMY       MEDICATIONS:                                                    I personally reviewed patient's medications and allergies with the patient at today's visit.  Current Outpatient Medications on File Prior to Visit:  SUMAtriptan (IMITREX) 50 MG tablet Take 1 tablet (50 mg) by mouth at onset of headache for migraine May repeat in 2 hours. Max 4 tablets/24 hours.      ALLERGIES:                                                      Allergies   Allergen Reactions     Nkda [No Known Drug Allergies]      FAMILY/SOCIAL HISTORY:                                                    Family and social history was reviewed with the patient at today's visit.  Family history is positive for stroke, headache, and Alzheimer's disease.  Never smoker.  1-2 alcohol drinks or less per week.  Denies use of recreational drugs.  , lives with her .  Works full-time.  REVIEW OF SYSTEMS:                                                    Patient has completed a [facility] Patient Health History, including a 14-system review, which was personally reviewed, and pertinent positives are listed in HPI. She denies any additional problems on the further questioning.  EXAM:                                                    VITAL SIGNS:   /66   Pulse 69   Temp 97.3  F (36.3  C) (Oral)   Resp 18   Ht 1.638 m (5' 4.5\")   Wt 56.9 kg (125 lb 8 oz)   LMP 01/13/2019   SpO2 100%   BMI 21.21 kg/m    Mini-Cog Assessment:  Mini Cog Assessment  Clock Draw Score: 2 Normal  3 Item Recall: 3 objects recalled  Mini Cog Total Score: 5    General: pt is in NAD, cooperative.  Skin: normal turgor, moist mucous membranes, no lesions/rashes noticed.  HEENT: ATNC, EOMI, PERRL, white sclera, normal conjunctiva, no nystagmus or ptosis. No carotid bruits bilaterally.  Respiratory: lung sounds clear to auscultation bilaterally, no crackles, wheezes, rhonchi. Symmetric lung " excursion, no accessory respiratory muscle use.  Cardiovascular: normal S1/S2, no murmurs/rubs/gallops.   Abdomen: Not distended.  : deferred.    Neurological:  Mental: alert, follows commands, Mini Cog Total Score: 5/5 with 3/3 on memory recall, no aphasia or dysarthria. Fund of knowledge is appropriate for age.  Cranial Nerves:  CN II: visual acuity - able to accurately count fingers with each eye. Visual fields intact, fundi: discs sharp, no papilledema and normal vessels bilaterally.  CN III, IV, VI: EOM intact, pupils equal and reactive  CN V: facial sensation nl  CN VII: face symmetric, no facial droop  CN VIII: hearing normal  CN IX: palate elevation symmetric, uvula at midline  CN XI SCM normal, shoulder shrug nl  CN XII: tongue midline  Motor: Strength: 5/5 in all major groups of all extremities. Normal tone. No abnormal movements. No pronator drift b/l.  Reflexes: Triceps, biceps, brachioradialis, patellar, and achilles reflexes normal and symmetric. No clonus noted. Toes are down-going b/l.   Sensory: temperature, light touch, pinprick, and vibration intact. Romberg: negative.  Coordination: FNF and heel-shin tests intact b/l.  Gait:  Normal, able to tandem, toe, and heel walk.  DATA:   LABS/IMAGING/OTHER STUDIES: I reviewed pertinent medical records, including Care Everywhere, as detailed in the history of present illness.  ASSESSMENT and PLAN:      ASSESSMENT: Damari Villalta is a 33 year old female patient without significant past medical history, who presents with chronic headache.  Her neurological exam is non-focal.  She did not have any previous brain imaging.  However, at the present time I do not see any indications to perform it.  Brain MRI could be considered in the future if her headaches become resistant to treatment, worsen, or the patient develops focal neurological symptoms.    The clinical presentation is most likely consistent with multifactorial headache disorder, including  tension type and possibly migrainous components.  Prior to stopping analgesics in January 2019, she probably also had medication overuse headaches (now resolved).  We reviewed available treatment options and the plan, as summarized below.    DIAGNOSES:    ICD-10-CM    1. Headache disorder R51      PLAN: At today's visit we discussed various diagnostic possibilities for patient's symptoms, most likely diagnosis, possible future workup (brain MRI), available treatment options, and the plan.    For headache prevention:  1.  Riboflavin 400 mg daily for the next 3 months.  2.  Other future preventive options include magnesium, Ronny-relief, Effexor, nortriptyline/amitriptyline, Topamax, gabapentin, and Depakote.  For acute headache therapy:  1.  I advised the patient to use Imitrex for only severe headaches and limit use to less than 9 days/month.  2.  We discussed that she may use ibuprofen or naproxen (500 mg) for moderate headaches.  I counseled the patient to take it with food and limit use to less than 15 days/month.    Reviewed non-pharmacological headache prevention measures include proper sleep hygiene, regular meals, adequate hydration, regular aerobic exercises, and stress reduction techniques.    I instructed the patient to keep the headache diary and bring it to next follow-up visit.    Additional recommendations after the work-up.    Next follow-up appointment is in the next 3 months or earlier if needed.    Total Time:  81 minutes with > 50% spent counseling the patient on stated above assessment and recommendations, including nature of the diagnosis, needed w/u, and proposed plan.  Additional time was used to answer questions regarding patient's symptoms, recommendations, and the plan.    Santi Hess MD  / Neurology  [place]  (Chart documentation was completed in part with Dragon voice-recognition software. Even though reviewed, some grammatical, spelling, and word errors may  remain.)

## 2019-01-31 ENCOUNTER — OFFICE VISIT (OUTPATIENT)
Dept: NEUROLOGY | Facility: CLINIC | Age: 34
End: 2019-01-31
Payer: COMMERCIAL

## 2019-01-31 VITALS
SYSTOLIC BLOOD PRESSURE: 103 MMHG | DIASTOLIC BLOOD PRESSURE: 66 MMHG | TEMPERATURE: 97.3 F | BODY MASS INDEX: 20.91 KG/M2 | HEART RATE: 69 BPM | HEIGHT: 65 IN | RESPIRATION RATE: 18 BRPM | OXYGEN SATURATION: 100 % | WEIGHT: 125.5 LBS

## 2019-01-31 DIAGNOSIS — R51.9 HEADACHE DISORDER: Primary | ICD-10-CM

## 2019-01-31 PROCEDURE — 99205 OFFICE O/P NEW HI 60 MIN: CPT | Performed by: PSYCHIATRY & NEUROLOGY

## 2019-01-31 ASSESSMENT — MIFFLIN-ST. JEOR: SCORE: 1262.2

## 2019-01-31 NOTE — PATIENT INSTRUCTIONS
AFTER VISIT SUMMARY (AVS):    At today's visit we discussed various diagnostic possibilities for your symptoms, most likely diagnosis, possible future workup (brain MRI), available treatment options, and the plan.    For headache prevention:  1.  Riboflavin 400 mg daily for the next 3 months.  2.  Other future preventive options include magnesium, Ronny-relief, Effexor, nortriptyline/amitriptyline, Topamax, gabapentin, and Depakote.  For acute headache therapy:  1.  Use Imitrex for only severe headaches.  Limit use to less than 9 days/month.  2.  May use ibuprofen or naproxen (500 mg) for moderate headaches.  Please take it with food and limit use to less than 15 days/month.    Reviewed non-pharmacological headache prevention measures include proper sleep hygiene, regular meals, adequate hydration, regular aerobic exercises, and stress reduction techniques.    Please keep the headache diary and bring it to next follow-up visit.    Additional recommendations after the work-up.    Next follow-up appointment is in the next 3 months or earlier if needed.    Please do not hesitate to call me with any questions or concerns.    Thanks.

## 2019-01-31 NOTE — LETTER
1/31/2019         RE: Damari Villalta  [address]  [facility] [address]        Dear Colleague,    Thank you for referring your patient, Damari Villalta, to the [facility]. Please see a copy of my visit note below.    INITIAL NEUROLOGY CONSULTATION    DATE OF VISIT: 1/31/2019  CLINIC LOCATION: [facility]  MRN: [id number]  PATIENT NAME: Damari Villalta  YOB: 1985    PRIMARY CARE PROVIDER: BAYLEE Willard CNP     REASON FOR VISIT:   Chief Complaint   Patient presents with     Headache     headaches for years, frequency increasing       HISTORY OF PRESENT ILLNESS:                                                    Ms. Damari Villalta is 33 year old right handed female patient without significant past medical history, who was seen in consultation today requested by BAYLEE Willard CNP, for chronic headache management.    Per patient's report, she has long-standing history of rare headaches since her high school that did not significantly change while she was in college.  Then, they increased in frequency to 1-2/month 5-6 years ago, and increased further to one or more per week for the last 1.5 years.  No perceivable change in other headache characteristics.    Intermittent pressure 6-7/10 headache is located in bifrontal areas (occasionally bioccipital regions) and could last up to 2 days.  It used to occurred recently up to 3-4 times per week.  Occasionally it could increase up to 9/10 and become throbbing.  At that point, she would also experience light and sound sensitivity, generalized weakness, nausea, and occasionally emesis.  No physical intolerance.  No auras or focal neurological symptoms.  She would find cold compress or walking helpful, but not necessarily need to be in a dark quiet room (does not make a significant difference).    3-4 years ago the patient went off birth control, but it did not produce any  noticeable improvement.  Tried low in nitrates and tyramine diet, lots of sleep, and no alcohol without success.  Fioricet was not helpful.  Tried Flexeril and flurbiprofen (September 2018 for 2 weeks) without relief.  Feels that Imitrex is helpful, but used to take it 3-4 times per week along with ibuprofen.  Since 1/1/2019 does not take any ibuprofen or Imitrex.  First 17 days she suffered through a few 2-3 day headaches, but since January 18, she did not have any headaches (which is a great improvement).  Was told by her primary care provider about magnesium and vitamin B2 for headache prevention, but did not take them consistently in the past.    The patient reports 8-9 hours of restful night sleep.  She eats regular meals and drinks on average 6 glasses of fluids per day, including 2 cups of coffee.  No other caffeinated beverages.  She rates her stress level as moderate.    No recent pertinent labs.  No prior brain or spine imaging.  No additional useful information is available in Care Everywhere, which was reviewed.    The patient denies a history of recent head injury. Prior neurological history: negative for stroke, brain neoplasms, seizure disorders, multiple sclerosis, meningitis, encephalitis, and major head injuries.    Neurologic Review of Systems - no amaurosis, diplopia, abnormal speech, unilateral numbness or weakness. She endorses anxiety, depression, restlessness, and memory problems (all minor, she previously did not seek medical attention regarding these, but planning to do so if they become more prominent in the future). Otherwise, she denies any other complaints on 14-point comprehensive review of systems.  PAST MEDICAL/SURGICAL HISTORY:                                                    I personally reviewed patient's past medical and surgical history with the patient at today's visit.  Patient Active Problem List   Diagnosis     CARDIOVASCULAR SCREENING; LDL GOAL LESS THAN 160     Encounter  "for other general counseling or advice on contraception     ASCUS of cervix with negative high risk HPV     Past Medical History:   Diagnosis Date     Abnormal Pap smear of cervix 01/11/2019 01/11/19: See problem list.      Past Surgical History:   Procedure Laterality Date     TONSILLECTOMY & ADENOIDECTOMY       MEDICATIONS:                                                    I personally reviewed patient's medications and allergies with the patient at today's visit.  Current Outpatient Medications on File Prior to Visit:  SUMAtriptan (IMITREX) 50 MG tablet Take 1 tablet (50 mg) by mouth at onset of headache for migraine May repeat in 2 hours. Max 4 tablets/24 hours.      ALLERGIES:                                                      Allergies   Allergen Reactions     Nkda [No Known Drug Allergies]      FAMILY/SOCIAL HISTORY:                                                    Family and social history was reviewed with the patient at today's visit.  Family history is positive for stroke, headache, and Alzheimer's disease.  Never smoker.  1-2 alcohol drinks or less per week.  Denies use of recreational drugs.  , lives with her .  Works full-time.  REVIEW OF SYSTEMS:                                                    Patient has completed a [facility] Patient Health History, including a 14-system review, which was personally reviewed, and pertinent positives are listed in HPI. She denies any additional problems on the further questioning.  EXAM:                                                    VITAL SIGNS:   /66   Pulse 69   Temp 97.3  F (36.3  C) (Oral)   Resp 18   Ht 1.638 m (5' 4.5\")   Wt 56.9 kg (125 lb 8 oz)   LMP 01/13/2019   SpO2 100%   BMI 21.21 kg/m     Mini-Cog Assessment:  Mini Cog Assessment  Clock Draw Score: 2 Normal  3 Item Recall: 3 objects recalled  Mini Cog Total Score: 5    General: pt is in NAD, cooperative.  Skin: normal turgor, moist mucous membranes, " no lesions/rashes noticed.  HEENT: ATNC, EOMI, PERRL, white sclera, normal conjunctiva, no nystagmus or ptosis. No carotid bruits bilaterally.  Respiratory: lung sounds clear to auscultation bilaterally, no crackles, wheezes, rhonchi. Symmetric lung excursion, no accessory respiratory muscle use.  Cardiovascular: normal S1/S2, no murmurs/rubs/gallops.   Abdomen: Not distended.  : deferred.    Neurological:  Mental: alert, follows commands, Mini Cog Total Score: 5/5 with 3/3 on memory recall, no aphasia or dysarthria. Fund of knowledge is appropriate for age.  Cranial Nerves:  CN II: visual acuity - able to accurately count fingers with each eye. Visual fields intact, fundi: discs sharp, no papilledema and normal vessels bilaterally.  CN III, IV, VI: EOM intact, pupils equal and reactive  CN V: facial sensation nl  CN VII: face symmetric, no facial droop  CN VIII: hearing normal  CN IX: palate elevation symmetric, uvula at midline  CN XI SCM normal, shoulder shrug nl  CN XII: tongue midline  Motor: Strength: 5/5 in all major groups of all extremities. Normal tone. No abnormal movements. No pronator drift b/l.  Reflexes: Triceps, biceps, brachioradialis, patellar, and achilles reflexes normal and symmetric. No clonus noted. Toes are down-going b/l.   Sensory: temperature, light touch, pinprick, and vibration intact. Romberg: negative.  Coordination: FNF and heel-shin tests intact b/l.  Gait:  Normal, able to tandem, toe, and heel walk.  DATA:   LABS/IMAGING/OTHER STUDIES: I reviewed pertinent medical records, including Care Everywhere, as detailed in the history of present illness.  ASSESSMENT and PLAN:      ASSESSMENT: Damari Villalta is a 33 year old female patient without significant past medical history, who presents with chronic headache.  Her neurological exam is non-focal.  She did not have any previous brain imaging.  However, at the present time I do not see any indications to perform it.  Brain MRI  could be considered in the future if her headaches become resistant to treatment, worsen, or the patient develops focal neurological symptoms.    The clinical presentation is most likely consistent with multifactorial headache disorder, including tension type and possibly migrainous components.  Prior to stopping analgesics in January 2019, she probably also had medication overuse headaches (now resolved).  We reviewed available treatment options and the plan, as summarized below.    DIAGNOSES:    ICD-10-CM    1. Headache disorder R51      PLAN: At today's visit we discussed various diagnostic possibilities for patient's symptoms, most likely diagnosis, possible future workup (brain MRI), available treatment options, and the plan.    For headache prevention:  1.  Riboflavin 400 mg daily for the next 3 months.  2.  Other future preventive options include magnesium, Ronny-relief, Effexor, nortriptyline/amitriptyline, Topamax, gabapentin, and Depakote.  For acute headache therapy:  1.  I advised the patient to use Imitrex for only severe headaches and limit use to less than 9 days/month.  2.  We discussed that she may use ibuprofen or naproxen (500 mg) for moderate headaches.  I counseled the patient to take it with food and limit use to less than 15 days/month.    Reviewed non-pharmacological headache prevention measures include proper sleep hygiene, regular meals, adequate hydration, regular aerobic exercises, and stress reduction techniques.    I instructed the patient to keep the headache diary and bring it to next follow-up visit.    Additional recommendations after the work-up.    Next follow-up appointment is in the next 3 months or earlier if needed.    Total Time:  81 minutes with > 50% spent counseling the patient on stated above assessment and recommendations, including nature of the diagnosis, needed w/u, and proposed plan.  Additional time was used to answer questions regarding patient's symptoms,  recommendations, and the plan.    Santi Hess MD  / Neurology  [place]  (Chart documentation was completed in part with Dragon voice-recognition software. Even though reviewed, some grammatical, spelling, and word errors may remain.)               Again, thank you for allowing me to participate in the care of your patient.        Sincerely,        Santi Hess MD

## 2019-02-21 ENCOUNTER — MYC REFILL (OUTPATIENT)
Dept: FAMILY MEDICINE | Facility: CLINIC | Age: 34
End: 2019-02-21

## 2019-02-21 DIAGNOSIS — G44.219 EPISODIC TENSION-TYPE HEADACHE, NOT INTRACTABLE: ICD-10-CM

## 2019-02-21 RX ORDER — SUMATRIPTAN 50 MG/1
50 TABLET, FILM COATED ORAL
Qty: 9 TABLET | Refills: 0 | Status: SHIPPED | OUTPATIENT
Start: 2019-02-21 | End: 2019-04-02

## 2019-04-01 ENCOUNTER — MYC MEDICAL ADVICE (OUTPATIENT)
Dept: FAMILY MEDICINE | Facility: CLINIC | Age: 34
End: 2019-04-01

## 2019-04-01 DIAGNOSIS — G44.219 EPISODIC TENSION-TYPE HEADACHE, NOT INTRACTABLE: ICD-10-CM

## 2019-04-02 RX ORDER — SUMATRIPTAN 50 MG/1
50 TABLET, FILM COATED ORAL
Qty: 9 TABLET | Refills: 0 | Status: SHIPPED | OUTPATIENT
Start: 2019-04-02 | End: 2019-04-22

## 2019-04-22 ENCOUNTER — MYC REFILL (OUTPATIENT)
Dept: FAMILY MEDICINE | Facility: CLINIC | Age: 34
End: 2019-04-22

## 2019-04-22 DIAGNOSIS — G44.219 EPISODIC TENSION-TYPE HEADACHE, NOT INTRACTABLE: ICD-10-CM

## 2019-04-23 NOTE — TELEPHONE ENCOUNTER
"Requested Prescriptions   Pending Prescriptions Disp Refills     SUMAtriptan (IMITREX) 50 MG tablet  Last Written Prescription Date:  4/2/2019  Last Fill Quantity: 9 tab,  # refills: 0   Last Office Visit: 1/11/2019 melendez    Future Office Visit:    Next 5 appointments (look out 90 days)    May 02, 2019 11:00 AM CDT  [facility] S[facility] Hess MD  [facility] [address] [address] [phone]          9 tablet 0     Sig: Take 1 tablet (50 mg) by mouth at onset of headache for migraine May repeat in 2 hours. Max 4 tablets/24 hours.       Serotonin Agonists Failed - 4/23/2019 10:10 AM        Failed - Serotonin Agonist request needs review.     Please review patient's record. If patient has had 8 or more treatments in the past month, please forward to provider.          Passed - Blood pressure under 140/90 in past 12 months     BP Readings from Last 3 Encounters:   01/31/19 103/66   01/11/19 99/72   09/11/18 114/71                 Passed - Recent (12 mo) or future (30 days) visit within the authorizing provider's specialty     Patient had office visit in the last 12 months or has a visit in the next 30 days with authorizing provider or within the authorizing provider's specialty.  See \"Patient Info\" tab in inbasket, or \"Choose Columns\" in Meds & Orders section of the refill encounter.              Passed - Medication is active on med list        Passed - Patient is age 18 or older        Passed - No active pregnancy on record        Passed - No positive pregnancy test in past 12 months        "

## 2019-04-24 RX ORDER — SUMATRIPTAN 50 MG/1
50 TABLET, FILM COATED ORAL
Qty: 9 TABLET | Refills: 0 | Status: SHIPPED | OUTPATIENT
Start: 2019-04-24 | End: 2019-05-20

## 2019-05-02 ENCOUNTER — OFFICE VISIT (OUTPATIENT)
Dept: NEUROLOGY | Facility: CLINIC | Age: 34
End: 2019-05-02
Payer: COMMERCIAL

## 2019-05-02 VITALS
WEIGHT: 126 LBS | DIASTOLIC BLOOD PRESSURE: 62 MMHG | TEMPERATURE: 97.6 F | BODY MASS INDEX: 21.29 KG/M2 | RESPIRATION RATE: 16 BRPM | SYSTOLIC BLOOD PRESSURE: 116 MMHG | HEART RATE: 67 BPM | OXYGEN SATURATION: 99 %

## 2019-05-02 DIAGNOSIS — G44.209 TENSION HEADACHE: Primary | ICD-10-CM

## 2019-05-02 DIAGNOSIS — G43.009 MIGRAINE WITHOUT AURA AND WITHOUT STATUS MIGRAINOSUS, NOT INTRACTABLE: ICD-10-CM

## 2019-05-02 PROCEDURE — 99215 OFFICE O/P EST HI 40 MIN: CPT | Performed by: PSYCHIATRY & NEUROLOGY

## 2019-05-02 RX ORDER — VENLAFAXINE HYDROCHLORIDE 37.5 MG/1
37.5 CAPSULE, EXTENDED RELEASE ORAL DAILY
Qty: 30 CAPSULE | Refills: 3 | Status: SHIPPED | OUTPATIENT
Start: 2019-05-02 | End: 2019-08-26

## 2019-05-02 NOTE — PATIENT INSTRUCTIONS
AFTER VISIT SUMMARY (AVS):    At today's visit we thoroughly reviewed current symptoms and your headache diary.  We also discussed available treatment options and the plan.    We decided to start small dose of Effexor for headache prevention in addition to your other medications.  Please contact my clinic if you experience any intolerable side effects and also send me an update in 4 to 6 weeks after starting this medication via My Chart.    Please continue the headache diary and bring it to the next follow up visit or upload it via My Chart.    Next follow-up appointment is in the next 3 months or earlier if needed.    Please do not hesitate to call me with any questions or concerns.    Thanks.

## 2019-05-02 NOTE — PROGRESS NOTES
ESTABLISHED PATIENT NEUROLOGY NOTE    DATE OF VISIT: 5/2/2019  CLINIC LOCATION: [facility]  MRN: [id number]  PATIENT NAME: Damari Villalta  YOB: 1985    PCP: BAYLEE Willard CNP    REASON FOR VISIT:   Chief Complaint   Patient presents with     Follow Up     headaches are better then last visit. Meds doing well     SUBJECTIVE:                                                      HISTORY OF PRESENT ILLNESS: Patient is here for follow up regarding multifactorial headache disorder. Please refer to my initial note from 01/31/2019 for further information.    Since the last visit, the patient reports headache improvement.  She feels that after she stopped ibuprofen her headaches are mainly occur around her menses, as they used to be.  We reviewed her headache diary together.  In February she had 5 headaches, in March for, in April 8, and in May 1 so far.  Alcohol, menses, and workouts seem to be identified triggers.  For headache prevention she is on riboflavin 400 mg daily.  Started magnesium approximately 4 weeks ago.  No significant side effects.  For acute therapy she uses Imitrex (effective).  Stopped ibuprofen altogether.  Did not try naproxen.  Denies interval development of new focal neurological symptoms.    On review of systems, patient endorses no other active complaints. Medications, allergies, family and social history were also reviewed. There are no changes reported by patient.  REVIEW OF SYSTEMS:                                                    10-system review was completed. Pertinent positives are included in HPI. The remainder of ROS is negative.  EXAM:                                                    Physical Exam:   Vitals: /62 (BP Location: Right arm, Patient Position: Sitting, Cuff Size: Adult Regular)   Pulse 67   Temp 97.6  F (36.4  C) (Oral)   Resp 16   Wt 57.2 kg (126 lb)   LMP 04/01/2019 (Approximate)   SpO2 99%   Breastfeeding?  No   BMI 21.29 kg/m      General: pt is in NAD, cooperative.  Skin: normal turgor, moist mucous membranes, no lesions/rashes noticed.  HEENT: ATNC, white sclera, normal conjunctiva.  Respiratory: Symmetric lung excursion, no accessory respiratory muscle use.  Abdomen: Non distended.  Neurological: awake, cooperative, follows commands, no no exam changes compared to the initial visit.  ASSESSMENT AND PLAN:                                                    Assessment: 33-year-old female patient with multifactorial (tension and migrainous) components presents for follow-up.  She reports headache improvement, but wants to achieve a better headache control.  For headache prevention she is on riboflavin 400 mg daily and magnesium without noticeable side effects.  For acute therapy she takes Imitrex.    We discussed in detail several available treatment options.  On one hand, longer acting triptan (frovatriptan, naratriptan or zolmitriptan) could be prescribed for catamenial migraines to take for several days during her periods.  Twice daily naproxen would be another option (less preferred in patient's opinion).  Extended cycle estrogen-progestin contraceptives, versus cyclic estrogen progestin contraceptives with supplemental estrogen in place of placebo pills or supplemental estrogen pills during menses could also be considered.  Finally, regular preventive options, including Effexor, nortriptyline, amitriptyline, doxepin, Topamax, gabapentin, and Depakote, could be tried.  After prolonged discussion, the patient chose to proceed with Effexor as the next step.  If ineffective, she would be interested in hormonal-based preventive options.    Diagnoses:    ICD-10-CM    1. Tension headache G44.209    2. Migraine without aura and without status migrainosus, not intractable G43.009 venlafaxine (EFFEXOR XR) 37.5 MG 24 hr capsule     Plan: At today's visit we thoroughly reviewed current symptoms and her headache diary.  We  also discussed available treatment options and the plan.    We decided to start small dose of Effexor for headache prevention in addition to her other medications.  I counseled the patient to contact my clinic if she experiences any intolerable side effects and also send me an update in 4 to 6 weeks after starting this medication via My Chart.     I advised to continue the headache diary and bring it to the next follow up visit or upload it via My Chart.    Next follow-up appointment is in the next 3 months or earlier if needed.    Total Time: 43 minutes with > 50% spent counseling the patient on stated above assessment and recommendations.    Santi Hess MD  HP/ Neurology

## 2019-05-02 NOTE — LETTER
5/2/2019         RE: Damari Villalta  [address]  [facility] [address]        Dear Colleague,    Thank you for referring your patient, Damari Villalta, to the [facility]. Please see a copy of my visit note below.    ESTABLISHED PATIENT NEUROLOGY NOTE    DATE OF VISIT: 5/2/2019  CLINIC LOCATION: [facility]  MRN: [id number]  PATIENT NAME: Damari Villalta  YOB: 1985    PCP: BAYLEE Willard CNP    REASON FOR VISIT:   Chief Complaint   Patient presents with     Follow Up     headaches are better then last visit. Meds doing well     SUBJECTIVE:                                                      HISTORY OF PRESENT ILLNESS: Patient is here for follow up regarding multifactorial headache disorder. Please refer to my initial note from 01/31/2019 for further information.    Since the last visit, the patient reports headache improvement.  She feels that after she stopped ibuprofen her headaches are mainly occur around her menses, as they used to be.  We reviewed her headache diary together.  In February she had 5 headaches, in March for, in April 8, and in May 1 so far.  Alcohol, menses, and workouts seem to be identified triggers.  For headache prevention she is on riboflavin 400 mg daily.  Started magnesium approximately 4 weeks ago.  No significant side effects.  For acute therapy she uses Imitrex (effective).  Stopped ibuprofen altogether.  Did not try naproxen.  Denies interval development of new focal neurological symptoms.    On review of systems, patient endorses no other active complaints. Medications, allergies, family and social history were also reviewed. There are no changes reported by patient.  REVIEW OF SYSTEMS:                                                    10-system review was completed. Pertinent positives are included in HPI. The remainder of ROS is negative.  EXAM:                                                     Physical Exam:   Vitals: /62 (BP Location: Right arm, Patient Position: Sitting, Cuff Size: Adult Regular)   Pulse 67   Temp 97.6  F (36.4  C) (Oral)   Resp 16   Wt 57.2 kg (126 lb)   LMP 04/01/2019 (Approximate)   SpO2 99%   Breastfeeding? No   BMI 21.29 kg/m       General: pt is in NAD, cooperative.  Skin: normal turgor, moist mucous membranes, no lesions/rashes noticed.  HEENT: ATNC, white sclera, normal conjunctiva.  Respiratory: Symmetric lung excursion, no accessory respiratory muscle use.  Abdomen: Non distended.  Neurological: awake, cooperative, follows commands, no no exam changes compared to the initial visit.  ASSESSMENT AND PLAN:                                                    Assessment: 33-year-old female patient with multifactorial (tension and migrainous) components presents for follow-up.  She reports headache improvement, but wants to achieve a better headache control.  For headache prevention she is on riboflavin 400 mg daily and magnesium without noticeable side effects.  For acute therapy she takes Imitrex.    We discussed in detail several available treatment options.  On one hand, longer acting triptan (frovatriptan, naratriptan or zolmitriptan) could be prescribed for catamenial migraines to take for several days during her periods.  Twice daily naproxen would be another option (less preferred in patient's opinion).  Extended cycle estrogen-progestin contraceptives, versus cyclic estrogen progestin contraceptives with supplemental estrogen in place of placebo pills or supplemental estrogen pills during menses could also be considered.  Finally , regular preventive options, including Effexor, nortriptyline, amitriptyline, doxepin, Topamax, gabapentin, and Depakote, could be tried.  After prolonged discussion, the patient chose to proceed with Effexor as the next step.  If ineffective, she would be interested in hormonal-based preventive options.    Diagnoses:    ICD-10-CM     1. Tension headache G44.209    2. Migraine without aura and without status migrainosus, not intractable G43.009 venlafaxine (EFFEXOR XR) 37.5 MG 24 hr capsule     Plan: At today's visit we thoroughly reviewed current symptoms and her headache diary.  We also discussed available treatment options and the plan.    We decided to start small dose of Effexor for headache prevention in addition to her other medications.  I counseled the patient to contact my clinic if she experiences any intolerable side effects and also send me an update in 4 to 6 weeks after starting this medication via My Chart.     I advised to continue the headache diary and bring it to the next follow up visit or upload it via My Chart.    Next follow-up appointment is in the next 3 months or earlier if needed.    Total Time: 43 minutes with > 50% spent counseling the patient on stated above assessment and recommendations.    Santi Hess MD  / Neurology      Again, thank you for allowing me to participate in the care of your patient.        Sincerely,        Santi Hess MD

## 2019-05-20 ENCOUNTER — MYC REFILL (OUTPATIENT)
Dept: FAMILY MEDICINE | Facility: CLINIC | Age: 34
End: 2019-05-20

## 2019-05-20 DIAGNOSIS — G44.219 EPISODIC TENSION-TYPE HEADACHE, NOT INTRACTABLE: ICD-10-CM

## 2019-05-21 NOTE — TELEPHONE ENCOUNTER
"Requested Prescriptions   Pending Prescriptions Disp Refills     SUMAtriptan (IMITREX) 50 MG tablet  Last Written Prescription Date:  4-24-19  Last Fill Quantity: 9 tab,  # refills: 0   Last office visit: 1/11/2019 with prescribing provider:  Lydia Maurice    Future Office Visit:   Next 5 appointments (look out 90 days)    Aug 01, 2019  9:00 AM CDT  Return Visit with Santi Hess MD  [facility] ([facility]) [address] [place] [id number] [phone]         9 tablet 0     Sig: Take 1 tablet (50 mg) by mouth at onset of headache for migraine May repeat in 2 hours. Max 4 tablets/24 hours.       Serotonin Agonists Failed - 5/21/2019  5:45 AM        Failed - Serotonin Agonist request needs review.     Please review patient's record. If patient has had 8 or more treatments in the past month, please forward to provider.        Passed - Blood pressure under 140/90 in past 12 months     BP Readings from Last 3 Encounters:   05/02/19 116/62   01/31/19 103/66   01/11/19 99/72           Passed - Recent (12 mo) or future (30 days) visit within the authorizing provider's specialty     Patient had office visit in the last 12 months or has a visit in the next 30 days with authorizing provider or within the authorizing provider's specialty.  See \"Patient Info\" tab in inbasket, or \"Choose Columns\" in Meds & Orders section of the refill encounter.          Passed - Medication is active on med list        Passed - Patient is age 18 or older        Passed - No active pregnancy on record        Passed - No positive pregnancy test in past 12 months         "

## 2019-05-22 RX ORDER — SUMATRIPTAN 50 MG/1
50 TABLET, FILM COATED ORAL
Qty: 9 TABLET | Refills: 1 | Status: SHIPPED | OUTPATIENT
Start: 2019-05-22 | End: 2019-06-19

## 2019-06-19 ENCOUNTER — MYC REFILL (OUTPATIENT)
Dept: FAMILY MEDICINE | Facility: CLINIC | Age: 34
End: 2019-06-19

## 2019-06-19 DIAGNOSIS — G44.219 EPISODIC TENSION-TYPE HEADACHE, NOT INTRACTABLE: ICD-10-CM

## 2019-06-21 RX ORDER — SUMATRIPTAN 50 MG/1
50 TABLET, FILM COATED ORAL
Qty: 9 TABLET | Refills: 1 | Status: SHIPPED | OUTPATIENT
Start: 2019-06-21 | End: 2019-07-15

## 2019-06-21 NOTE — TELEPHONE ENCOUNTER
Prescription approved per [facility] Refill Protocol.  Kiara Grant RN

## 2019-07-15 ENCOUNTER — MYC REFILL (OUTPATIENT)
Dept: FAMILY MEDICINE | Facility: CLINIC | Age: 34
End: 2019-07-15

## 2019-07-15 DIAGNOSIS — G44.219 EPISODIC TENSION-TYPE HEADACHE, NOT INTRACTABLE: ICD-10-CM

## 2019-07-19 RX ORDER — SUMATRIPTAN 50 MG/1
50 TABLET, FILM COATED ORAL
Qty: 9 TABLET | Refills: 1 | Status: SHIPPED | OUTPATIENT
Start: 2019-07-19 | End: 2019-08-12

## 2019-08-22 DIAGNOSIS — G43.009 MIGRAINE WITHOUT AURA AND WITHOUT STATUS MIGRAINOSUS, NOT INTRACTABLE: ICD-10-CM

## 2019-08-22 NOTE — TELEPHONE ENCOUNTER
"Requested Prescriptions   Pending Prescriptions Disp Refills     venlafaxine (EFFEXOR XR) 37.5 MG 24 hr capsule 30 capsule 3     Sig: Take 1 capsule (37.5 mg) by mouth daily  Last Written Prescription Date:  5/2/2019  Last Fill Quantity: 30 capsule,  # refills: 3   Last Office Visit: 1/11/2019   Future Office Visit:            Serotonin-Norepinephrine Reuptake Inhibitors  Failed - 8/22/2019 10:01 AM        Failed - Normal serum creatinine on file in past 12 months     No lab results found.          Passed - Blood pressure under 140/90 in past 12 months     BP Readings from Last 3 Encounters:   05/02/19 116/62   01/31/19 103/66   01/11/19 99/72           Passed - Recent (12 mo) or future (30 days) visit within the authorizing provider's specialty     Patient had office visit in the last 12 months or has a visit in the next 30 days with authorizing provider or within the authorizing provider's specialty.  See \"Patient Info\" tab in inbasket, or \"Choose Columns\" in Meds & Orders section of the refill encounter.            Passed - Medication is active on med list        Passed - Patient is age 18 or older        Passed - No active pregnancy on record        Passed - No positive pregnancy test in past 12 months          "

## 2019-08-23 NOTE — TELEPHONE ENCOUNTER
Dr. Hess:   Routing refill request to provider for review/approval because:  Associated diagnosis not on [facility] refill protocol: Migraine without aura and without status migrainosus, not intractable [G43.009]     Thank You!  Isabel Cervantes RN  Triage Nurse

## 2019-08-26 RX ORDER — VENLAFAXINE HYDROCHLORIDE 37.5 MG/1
37.5 CAPSULE, EXTENDED RELEASE ORAL DAILY
Qty: 30 CAPSULE | Refills: 3 | Status: SHIPPED | OUTPATIENT
Start: 2019-08-26 | End: 2019-10-25

## 2019-09-17 ENCOUNTER — MYC REFILL (OUTPATIENT)
Dept: FAMILY MEDICINE | Facility: CLINIC | Age: 34
End: 2019-09-17

## 2019-09-17 DIAGNOSIS — G44.219 EPISODIC TENSION-TYPE HEADACHE, NOT INTRACTABLE: ICD-10-CM

## 2019-09-17 RX ORDER — SUMATRIPTAN 50 MG/1
50 TABLET, FILM COATED ORAL
Qty: 9 TABLET | Refills: 1 | Status: CANCELLED | OUTPATIENT
Start: 2019-09-17

## 2019-09-23 NOTE — TELEPHONE ENCOUNTER
"Requested Prescriptions   Pending Prescriptions Disp Refills     SUMAtriptan (IMITREX) 50 MG tablet 9 tablet 1     Sig: Take 1 tablet (50 mg) by mouth at onset of headache for migraine May repeat in 2 hours. Max 4 tablets/24 hours.         Last Written Prescription Date:  8/16/19  Last Fill Quantity: 9,   # refills: 1  Last Office Visit: 1/11/19  Future Office visit:       Patient states she has a refill      Serotonin Agonists Failed - 9/20/2019  4:39 PM        Failed - Serotonin Agonist request needs review.     Please review patient's record. If patient has had 8 or more treatments in the past month, please forward to provider.          Passed - Blood pressure under 140/90 in past 12 months     BP Readings from Last 3 Encounters:   05/02/19 116/62   01/31/19 103/66   01/11/19 99/72                 Passed - Recent (12 mo) or future (30 days) visit within the authorizing provider's specialty     Patient had office visit in the last 12 months or has a visit in the next 30 days with authorizing provider or within the authorizing provider's specialty.  See \"Patient Info\" tab in inbasket, or \"Choose Columns\" in Meds & Orders section of the refill encounter.              Passed - Medication is active on med list        Passed - Patient is age 18 or older        Passed - No active pregnancy on record        Passed - No positive pregnancy test in past 12 months          "

## 2019-09-29 ENCOUNTER — HEALTH MAINTENANCE LETTER (OUTPATIENT)
Age: 34
End: 2019-09-29

## 2019-10-24 NOTE — PATIENT INSTRUCTIONS
AFTER VISIT SUMMARY (AVS):    At today's visit we thoroughly discussed current symptoms, reviewed headache diary, available treatment options, and the plan.  We decided to increase the dose of Effexor. Sumatriptan was refilled.    Please keep the headache diary and bring it to the next follow-up visit or upload via My Chart.    Next follow-up appointment is in the next 3 months or earlier if needed.    Please do not hesitate to call me with any questions or concerns.    Thanks.

## 2019-10-24 NOTE — PROGRESS NOTES
ESTABLISHED PATIENT NEUROLOGY NOTE    DATE OF VISIT: 10/25/2019  CLINIC LOCATION: [facility]  MRN: [id number]  PATIENT NAME: Damari Villalta  YOB: 1985    PCP: BAYLEE Willard CNP    REASON FOR VISIT:   Chief Complaint   Patient presents with     Follow Up     migraines- little better      SUBJECTIVE:                                                      HISTORY OF PRESENT ILLNESS: Patient is here for follow up regarding multifactorial headache disorder.  Last seen on 05/02/2019.  At that time, low-dose of Effexor was started.  The plan was to follow-up in 3 months, but the patient did not come until now.  Please refer to my initial/other prior notes for further information.    Since the last visit, the patient reports that headaches reduced in frequency and severity.  We reviewed her headache diary together.  In May she had 9 headaches ranging from 4-6/10.  In June she had 6, in July 5, in August 6, and in September 4 headaches of the same intensity.  In October she had 6 headaches so far with 1 of them being 7/10.  Also noticed that she develops headaches after intense exercise (10 mile run) or during her menses.    For headache prevention the patient is on 37.5 mg of Effexor, riboflavin, and magnesium.  For acute therapy she uses Imitrex approximately 8 to 9 days/month.  Takes occasional Tylenol for milder headaches, but rarely.  No significant side effects, except occasional constipation (improved with increased hydration and diet changes).  No interval development of new neurological symptoms.    On review of systems, patient endorses no other active complaints. Medications, allergies, family and social history were also reviewed. There are no changes reported by patient.  REVIEW OF SYSTEMS:                                                    10-system review was completed. Pertinent positives are included in HPI. The remainder of ROS is negative.  EXAM:                                                     Physical Exam:   Vitals: BP 98/60 (BP Location: Right arm, Patient Position: Sitting, Cuff Size: Adult Regular)   Pulse (!) 46   Temp 96.6  F (35.9  C) (Oral)   Wt 56.1 kg (123 lb 9.6 oz)   SpO2 99%   BMI 20.89 kg/m      General: pt is in NAD, cooperative.  Skin: normal turgor, moist mucous membranes, no lesions/rashes noticed.  HEENT: ATNC, white sclera, normal conjunctiva.  Respiratory: Symmetric lung excursion, no accessory respiratory muscle use.  Abdomen: Non distended.  Neurological: awake, cooperative, follows commands, no aphasia or dysarthria noted, cranial nerves II-XII: no ptosis, extraocular motility is full, face is symmetric, tongue is midline, equally moves all extremities, no dysmetria bilaterally, casual gait is normal.  ASSESSMENT AND PLAN:                                                    Assessment: 34-year-old female patient with multifactorial (tension and migraine) headache disorder presents for follow-up.  She is on 37.5 mg of daily Effexor in addition to riboflavin and magnesium for headache prevention.  She uses Imitrex for acute therapy (effective).  She reports headache improvement (less frequent and severe).    We had a detailed discussion with the patient regarding her current symptoms, available treatment options, and the plan.  We discussed the longer acting triptan (frovatriptan, naratriptan, or zolmitriptan) could be considered for catamenial migraines.  Twice daily naproxen would be another option during that time.  Hormonal contraceptives could be used alternatively.  Otherwise, her dose of Effexor could be further increased.  We could also try nortriptyline, amitriptyline, doxepin, Topamax, gabapentin, and Depakote.  We decided to further increase the dose of Effexor without making any other changes.    Diagnoses:    ICD-10-CM    1. Migraine without aura and without status migrainosus, not intractable G43.009 venlafaxine (EFFEXOR XR)  37.5 MG 24 hr capsule     SUMAtriptan (IMITREX) 50 MG tablet   2. Episodic tension-type headache, not intractable G44.219      Plan: At today's visit we thoroughly discussed current symptoms, reviewed headache diary, available treatment options, and the plan.  We decided to increase the dose of Effexor. Sumatriptan was refilled.    I counseled the patient to keep the headache diary and bring it to the next follow-up visit or upload via My Chart.    Next follow-up appointment is in the next 3 months or earlier if needed.    Total Time: 41 minutes with > 50% spent counseling the patient on stated above assessment and recommendations.    Santi Hess MD  / Neurology

## 2019-10-25 ENCOUNTER — OFFICE VISIT (OUTPATIENT)
Dept: NEUROLOGY | Facility: CLINIC | Age: 34
End: 2019-10-25
Payer: COMMERCIAL

## 2019-10-25 VITALS
BODY MASS INDEX: 20.89 KG/M2 | SYSTOLIC BLOOD PRESSURE: 98 MMHG | OXYGEN SATURATION: 99 % | HEART RATE: 46 BPM | WEIGHT: 123.6 LBS | TEMPERATURE: 96.6 F | DIASTOLIC BLOOD PRESSURE: 60 MMHG

## 2019-10-25 DIAGNOSIS — G43.009 MIGRAINE WITHOUT AURA AND WITHOUT STATUS MIGRAINOSUS, NOT INTRACTABLE: Primary | ICD-10-CM

## 2019-10-25 DIAGNOSIS — G44.219 EPISODIC TENSION-TYPE HEADACHE, NOT INTRACTABLE: ICD-10-CM

## 2019-10-25 PROCEDURE — 99215 OFFICE O/P EST HI 40 MIN: CPT | Performed by: PSYCHIATRY & NEUROLOGY

## 2019-10-25 RX ORDER — SUMATRIPTAN 50 MG/1
50 TABLET, FILM COATED ORAL
Qty: 9 TABLET | Refills: 3 | Status: SHIPPED | OUTPATIENT
Start: 2019-10-25 | End: 2020-01-24

## 2019-10-25 RX ORDER — VENLAFAXINE HYDROCHLORIDE 75 MG/1
75 CAPSULE, EXTENDED RELEASE ORAL DAILY
Qty: 90 CAPSULE | Refills: 0 | Status: SHIPPED | OUTPATIENT
Start: 2019-10-25 | End: 2020-01-24

## 2019-10-25 NOTE — LETTER
10/25/2019         RE: Damari Villalta  [address]  [place] [address]        Dear Colleague,    Thank you for referring your patient, Damari Villalta, to the [facility]. Please see a copy of my visit note below.    ESTABLISHED PATIENT NEUROLOGY NOTE    DATE OF VISIT: 10/25/2019  CLINIC LOCATION: [facility]  MRN: [id number]  PATIENT NAME: Damari Villalta  YOB: 1985    PCP: BAYLEE Willard CNP    REASON FOR VISIT:   Chief Complaint   Patient presents with     Follow Up     migraines- little better      SUBJECTIVE:                                                      HISTORY OF PRESENT ILLNESS: Patient is here for follow up regarding multifactorial headache disorder.  Last seen on 05/02/2019.  At that time, low-dose of Effexor was started.  The plan was to follow-up in 3 months, but the patient did not come until now.  Please refer to my initial/other prior notes for further information.    Since the last visit, the patient reports that headaches reduced in frequency and severity.  We reviewed her headache diary together.  In May she had 9 headaches ranging from 4-6/10.  In June she had 6, in July 5, in August 6, and in September 4 headaches of the same intensity.  In October she had 6 headaches so far with 1 of them being 7/10.  Also noticed that she develops headaches after intense exercise (10 mile run) or during her menses.    For headache prevention the patient is on 37.5 mg of Effexor, riboflavin, and magnesium.  For acute therapy she uses Imitrex approximately 8 to 9 days/month.  Takes occasional Tylenol for milder headaches, but rarely.  No significant side effects, except occasional constipation (improved with increased hydration and diet changes).  No interval development of new neurological symptoms.    On review of systems, patient endorses no other active complaints. Medications, allergies, family and social history  were also reviewed. There are no changes reported by patient.  REVIEW OF SYSTEMS:                                                    10-system review was completed. Pertinent positives are included in HPI. The remainder of ROS is negative.  EXAM:                                                    Physical Exam:   Vitals: BP 98/60 (BP Location: Right arm, Patient Position: Sitting, Cuff Size: Adult Regular)   Pulse (!) 46   Temp 96.6  F (35.9  C) (Oral)   Wt 56.1 kg (123 lb 9.6 oz)   SpO2 99%   BMI 20.89 kg/m       General: pt is in NAD, cooperative.  Skin: normal turgor, moist mucous membranes, no lesions/rashes noticed.  HEENT: ATNC, white sclera, normal conjunctiva.  Respiratory: Symmetric lung excursion, no accessory respiratory muscle use.  Abdomen: Non distended.  Neurological: awake, cooperative, follows commands, no aphasia or dysarthria noted, cranial nerves II-XII: no ptosis, extraocular motility is full, face is symmetric, tongue is midline, equally moves all extremities, no dysmetria bilaterally, casual gait is normal.  ASSESSMENT AND PLAN:                                                    Assessment: 34-year-old female patient with multifactorial (tension and migraine) headache disorder presents for follow-up.  She is on 37.5 mg of daily Effexor in addition to riboflavin and magnesium for headache prevention.  She uses Imitrex for acute therapy (effective).  She reports headache improvement (less frequent and severe).    We had a detailed discussion with the patient regarding her current symptoms, available treatment options, and the plan.  We discussed the longer acting triptan (frovatriptan, naratriptan, or zolmitriptan) could be considered for catamenial migraines.  Twice daily naproxen would be another option during that time.  Hormonal contraceptives could be used alternatively.  Otherwise, her dose of Effexor could be further increased.  We could also try nortriptyline, amitriptyline,  doxepin, Topamax, gabapentin, and Depakote.  We decided to further increase the dose of Effexor without making any other changes.    Diagnoses:    ICD-10-CM    1. Migraine without aura and without status migrainosus, not intractable G43.009 venlafaxine (EFFEXOR XR) 37.5 MG 24 hr capsule     SUMAtriptan (IMITREX) 50 MG tablet   2. Episodic tension-type headache, not intractable G44.219      Plan: At today's visit we thoroughly discussed current symptoms, reviewed headache diary, available treatment options, and the plan.  We decided to increase the dose of Effexor. Sumatriptan was refilled.    I counseled the patient to keep the headache diary and bring it to the next follow-up visit or upload via My Chart.    Next follow-up appointment is in the next 3 months or earlier if needed.    Total Time: 41 minutes with > 50% spent counseling the patient on stated above assessment and recommendations.    Santi Hess MD  / Neurology      Again, thank you for allowing me to participate in the care of your patient.        Sincerely,        Santi Hess MD

## 2020-01-23 PROBLEM — G44.209 TENSION HEADACHE: Status: ACTIVE | Noted: 2020-01-23

## 2020-01-23 PROBLEM — G43.009 MIGRAINE WITHOUT AURA AND WITHOUT STATUS MIGRAINOSUS, NOT INTRACTABLE: Status: ACTIVE | Noted: 2020-01-23

## 2020-01-23 NOTE — PROGRESS NOTES
ESTABLISHED PATIENT NEUROLOGY NOTE    DATE OF VISIT: 1/24/2020  CLINIC LOCATION: [facility]  MRN: [id number]  PATIENT NAME: Damari Villalta  YOB: 1985    PCP: BAYLEE Willard CNP    REASON FOR VISIT:   Chief Complaint   Patient presents with     Headache     SUBJECTIVE:                                                      HISTORY OF PRESENT ILLNESS: Patient is here for follow up regarding multifactorial headache disorder.  Last visit was on 10/25/2019.  At that time her Effexor dose was increased to 75 mg daily. Please refer to my initial/other prior notes for further information.    Since the last visit, the patient reports headache improvement, they are less frequent and intense.  We reviewed her headache diary together.  She had one additional headache in October.  In November and December she had 6 headache episodes each month.  In January she had 3 headache attacks so far.    For headache prevention she takes Effexor 75 mg daily in addition to riboflavin and magnesium.  For acute therapy she uses Imitrex and occasional Tylenol.  Denies any significant side effects.  Denies interval development of new focal neurological symptoms.    On review of systems, patient endorses no other active complaints. Medications, allergies, family and social history were also reviewed. There are no changes reported by patient.  REVIEW OF SYSTEMS:                                                    10-system review was completed. Pertinent positives are included in HPI. The remainder of ROS is negative.  EXAM:                                                    Physical Exam:   Vitals: /74   Pulse 50   Resp 16   Wt 57.2 kg (126 lb)   SpO2 100%   BMI 21.29 kg/m      General: pt is in NAD, cooperative.  Skin: normal turgor, moist mucous membranes, no lesions/rashes noticed.  HEENT: ATNC, white sclera, normal conjunctiva.  Respiratory: Symmetric lung excursion, no accessory  respiratory muscle use.  Abdomen: Non distended.  Neurological: awake, cooperative, follows commands, no exam changes compared to the last visit.  ASSESSMENT AND PLAN:                                                    Assessment: 34-year-old female patient with multifactorial (migrainous and tension) headache disorder presents for follow-up.  She reports headache improvement on increased dose of Effexor (75 mg daily).  She also takes riboflavin and magnesium for headache prevention and uses Imitrex with occasional Tylenol for acute therapy (effective).    We had a detailed discussion with the patient regarding her symptoms, treatment options, and the proposed plan.  We previously discussed the utility of longer acting triptan (frovatriptan, naratriptan, or zolmitriptan) for management of catamenial migraines versus twice daily naproxen or hormonal contraceptives.  Other preventive options include nortriptyline, amitriptyline, doxepin, Topamax, gabapentin, and Depakote.  However, given the perceived improvement of her headaches, we decided to continue the same dose of Effexor for the next 3 months.  No changes are needed in acute therapy, which is effective at the present time.    Diagnoses:    ICD-10-CM    1. Migraine without aura and without status migrainosus, not intractable G43.009 venlafaxine (EFFEXOR-XR) 75 MG 24 hr capsule     SUMAtriptan (IMITREX) 50 MG tablet   2. Tension headache G44.209      Plan: At today's visit we thoroughly discussed current symptoms, available treatment options, and the plan.  I am pleased to hear that her headaches improved.  We decided not to make any medication changes.  Effexor and Imitrex were refilled.    I instructed the patient to keep the headache diary and bring it to the next follow-up visit or upload via My Chart.    Next follow-up appointment is in the next 3 months or earlier if needed.    Total Time: 26 minutes with > 50% spent counseling the patient on stated above  assessment and recommendations.    Santi Hess MD  HP/HW Neurology

## 2020-01-23 NOTE — PATIENT INSTRUCTIONS
AFTER VISIT SUMMARY (AVS):    At today's visit we thoroughly discussed current symptoms, available treatment options, and the plan.  I am pleased to hear that your headaches improved.  We decided not to make any medication changes.  Your Effexor and Imitrex were refilled.    Please keep the headache diary and bring it to the next follow-up visit or upload via My Chart.    Next follow-up appointment is in the next 3 months or earlier if needed.    Please do not hesitate to call me with any questions or concerns.    Thanks.

## 2020-01-24 ENCOUNTER — OFFICE VISIT (OUTPATIENT)
Dept: NEUROLOGY | Facility: CLINIC | Age: 35
End: 2020-01-24
Payer: COMMERCIAL

## 2020-01-24 VITALS
OXYGEN SATURATION: 100 % | BODY MASS INDEX: 21.29 KG/M2 | RESPIRATION RATE: 16 BRPM | DIASTOLIC BLOOD PRESSURE: 74 MMHG | HEART RATE: 50 BPM | SYSTOLIC BLOOD PRESSURE: 102 MMHG | WEIGHT: 126 LBS

## 2020-01-24 DIAGNOSIS — G43.009 MIGRAINE WITHOUT AURA AND WITHOUT STATUS MIGRAINOSUS, NOT INTRACTABLE: Primary | ICD-10-CM

## 2020-01-24 DIAGNOSIS — G44.209 TENSION HEADACHE: ICD-10-CM

## 2020-01-24 PROCEDURE — 99214 OFFICE O/P EST MOD 30 MIN: CPT | Performed by: PSYCHIATRY & NEUROLOGY

## 2020-01-24 RX ORDER — VENLAFAXINE HYDROCHLORIDE 75 MG/1
75 CAPSULE, EXTENDED RELEASE ORAL DAILY
Qty: 90 CAPSULE | Refills: 1 | Status: SHIPPED | OUTPATIENT
Start: 2020-01-24 | End: 2020-04-24

## 2020-01-24 RX ORDER — SUMATRIPTAN 50 MG/1
50 TABLET, FILM COATED ORAL
Qty: 9 TABLET | Refills: 3 | Status: SHIPPED | OUTPATIENT
Start: 2020-01-24 | End: 2020-04-24

## 2020-01-24 NOTE — LETTER
1/24/2020         RE: Damari Villalta  [address]  [place] [address]        Dear Colleague,    Thank you for referring your patient, Damari Villalta, to the [facility]. Please see a copy of my visit note below.    ESTABLISHED PATIENT NEUROLOGY NOTE    DATE OF VISIT: 1/24/2020  CLINIC LOCATION: [facility]  MRN: [id number]  PATIENT NAME: Damari Villalta  YOB: 1985    PCP: BAYLEE Willard CNP    REASON FOR VISIT:   Chief Complaint   Patient presents with     Headache     SUBJECTIVE:                                                      HISTORY OF PRESENT ILLNESS: Patient is here for follow up regarding multifactorial headache disorder.  Last visit was on 10/25/2019.  At that time her Effexor dose was increased to 75 mg daily. Please refer to my initial/other prior notes for further information.    Since the last visit, the patient reports headache improvement, they are less frequent and intense.  We reviewed her headache diary together.  She had one additional headache in October.  In November and December she had 6 headache episodes each month.  In January she had 3 headache attacks so far.    For headache prevention she takes Effexor 75 mg daily in addition to riboflavin and magnesium.  For acute therapy she uses Imitrex and occasional Tylenol.  Denies any significant side effects.  Denies interval development of new focal neurological symptoms.    On review of systems, patient endorses no other active complaints. Medications, allergies, family and social history were also reviewed. There are no changes reported by patient.  REVIEW OF SYSTEMS:                                                    10-system review was completed. Pertinent positives are included in HPI. The remainder of ROS is negative.  EXAM:                                                    Physical Exam:   Vitals: /74   Pulse 50   Resp 16   Wt 57.2 kg (126  lb)   SpO2 100%   BMI 21.29 kg/m       General: pt is in NAD, cooperative.  Skin: normal turgor, moist mucous membranes, no lesions/rashes noticed.  HEENT: ATNC, white sclera, normal conjunctiva.  Respiratory: Symmetric lung excursion, no accessory respiratory muscle use.  Abdomen: Non distended.  Neurological: awake, cooperative, follows commands, no exam changes compared to the last visit.  ASSESSMENT AND PLAN:                                                    Assessment: 34-year-old female patient with multifactorial (migrainous and tension) headache disorder presents for follow-up.  She reports headache improvement on increased dose of Effexor (75 mg daily).  She also takes riboflavin and magnesium for headache prevention and uses Imitrex with occasional Tylenol for acute therapy (effective).    We had a detailed discussion with the patient regarding her symptoms, treatment options, and the proposed plan.  We previously discussed the utility of longer acting triptan (frovatriptan, naratriptan, or zolmitriptan) for management of catamenial migraines versus twice daily naproxen or hormonal contraceptives.  Other preventive options include nortriptyline, amitriptyline, doxepin, Topamax, gabapentin, and Depakote.  However, given the perceived improvement of her headaches, we decided to continue the same dose of Effexor for the next 3 months.  No changes are needed in acute therapy, which is effective at the present time.    Diagnoses:    ICD-10-CM    1. Migraine without aura and without status migrainosus, not intractable G43.009 venlafaxine (EFFEXOR-XR) 75 MG 24 hr capsule     SUMAtriptan (IMITREX) 50 MG tablet   2. Tension headache G44.209      Plan: At today's visit we thoroughly discussed current symptoms, available treatment options, and the plan.  I am pleased to hear that her headaches improved.  We decided not to make any medication changes.  Effexor and Imitrex were refilled.    I instructed the patient  to keep the headache diary and bring it to the next follow-up visit or upload via My Chart.    Next follow-up appointment is in the next 3 months or earlier if needed.    Total Time: 26 minutes with > 50% spent counseling the patient on stated above assessment and recommendations.    Santi Hess MD  / Neurology      Again, thank you for allowing me to participate in the care of your patient.        Sincerely,        Santi Hess MD

## 2020-03-15 ENCOUNTER — HEALTH MAINTENANCE LETTER (OUTPATIENT)
Age: 35
End: 2020-03-15

## 2020-04-23 NOTE — PATIENT INSTRUCTIONS
I am pleased to hear that your headaches remain well controlled.  We decided not to make any medication changes.  Your Effexor and Imitrex were refilled.  Please keep the headache diary and bring it to the next follow-up visit or upload via My Chart.  Follow-up is in the next 3 months or sooner if needed.

## 2020-04-23 NOTE — PROGRESS NOTES
"TELEPHONE ENCOUNTER NOTE    DATE OF VISIT: 4/24/2020  CLINIC LOCATION: [facility]  MRN: [id number]  PATIENT NAME: Damari Villalta  YOB: 1985  PCP: BAYLEE Willard CNP    Damari Villalta is a 34 year old female who is being evaluated via a billable telephone visit due to COVID-19 precautions.      The patient has been notified of following:     \"This telephone visit will be conducted via a call between you and your physician/provider. We have found that certain health care needs can be provided without the need for a physical exam.  This service lets us provide the care you need with a short phone conversation.  If a prescription is necessary we can send it directly to your pharmacy.  If lab work is needed we can place an order for that and you can then stop by our lab to have the test done at a later time.    If during the course of the call the physician/provider feels a telephone visit is not appropriate, you will not be charged for this service.\"     Damari Villalta complains of    Chief Complaint   Patient presents with     Follow Up     HA- well controlled      I have reviewed and updated the patient's Past Medical History, Social History, Family History and Medication List.    ALLERGIES  Nkda [no known drug allergies]    The consent for this service was obtained by:  Sabas Larry on 4/24/2020 at 8:56 AM    Additional provider notes:    REASON FOR VISIT:   Chief Complaint   Patient presents with     Follow Up     HA- well controlled      SUBJECTIVE:                                                      HISTORY OF PRESENT ILLNESS: This telephone encounter is regarding multifactorial headache disorder.  She was last seen on 1/24/2020.  At that time no medication changes were made due to reported headache improvement. Please refer to my initial/other prior notes for further information.    Since the last visit, the patient reports that overall her " headaches are well managed, except for this week.  We reviewed her headache diary together.  She had 3 migraine attacks in February, 3 in March, and 4 in April so far.  She had headache every day from April 19 through April 21 (she is not sure of reason).  She ran 10 mile marathon on first day without drinking and did not eat much that day.  She thinks that the following 2 days possibly could be rebound headaches.  Also notices that headaches are triggered by her menses, alcohol use, and high intensity workouts.  She is on preventive Effexor (75 mg daily), magnesium and riboflavin and takes Tylenol/Imitrex for acute therapy, which are effective.  No significant side effects.  No interval development of new focal neurological symptoms.    On review of systems, patient endorses no other active complaints.   ASSESSMENT AND PLAN:                                                    Assessment: 34 year old female patient, who due to COVID-19 precautions is evaluated via a telephone visit for multifactorial headache disorder.  She reports that headaches continue to be well controlled on current regimen of Effexor/magnesium/riboflavin (for headache prevention) and Imitrex/Tylenol (for acute therapy).  We had a detailed discussion with the patient regarding her current symptoms, available treatment options, and the plan.  We discussed that we could further increase dose of Effexor, try a different antidepressant (nortriptyline, amitriptyline, or doxepin), Topamax, gabapentin, Depakote, or CGRP antagonists for headache prevention.  We also discussed options of longer acting triptan and naproxen for management of catamenial migraines.  Imitrex seems to be working well, and no adjustments for acute treatment are needed at the present time.    Diagnoses:    ICD-10-CM    1. Migraine without aura and without status migrainosus, not intractable  G43.009    2. Tension headache  G44.209      Plan:  I am pleased to hear that patient's  headaches remain well controlled.  We decided not to make any medication changes.  Effexor and Imitrex were refilled.  I instructed the patient to keep the headache diary and bring it to the next follow-up visit or upload via My Chart.  Follow-up is in the next 3 months or sooner if needed.    I have reviewed the note as documented above.  This accurately captures the substance of my conversation with the patient.    Phone call contact time:  Call Started at 8:57 AM.  Call Ended at 9:03 AM.    Santi Hess MD  HP/ Neurology.

## 2020-04-24 ENCOUNTER — VIRTUAL VISIT (OUTPATIENT)
Dept: NEUROLOGY | Facility: CLINIC | Age: 35
End: 2020-04-24
Payer: COMMERCIAL

## 2020-04-24 DIAGNOSIS — G43.009 MIGRAINE WITHOUT AURA AND WITHOUT STATUS MIGRAINOSUS, NOT INTRACTABLE: Primary | ICD-10-CM

## 2020-04-24 DIAGNOSIS — G44.209 TENSION HEADACHE: ICD-10-CM

## 2020-04-24 PROCEDURE — 99212 OFFICE O/P EST SF 10 MIN: CPT | Mod: TEL | Performed by: PSYCHIATRY & NEUROLOGY

## 2020-04-24 RX ORDER — VENLAFAXINE HYDROCHLORIDE 75 MG/1
75 CAPSULE, EXTENDED RELEASE ORAL DAILY
Qty: 90 CAPSULE | Refills: 1 | Status: SHIPPED | OUTPATIENT
Start: 2020-04-24 | End: 2020-12-01

## 2020-04-24 RX ORDER — SUMATRIPTAN 50 MG/1
50 TABLET, FILM COATED ORAL
Qty: 9 TABLET | Refills: 5 | Status: SHIPPED | OUTPATIENT
Start: 2020-04-24 | End: 2020-12-01

## 2020-11-27 NOTE — PROGRESS NOTES
"TELEPHONE ENCOUNTER NOTE    DATE OF VISIT: 12/1/2020  CLINIC LOCATION: [facility]  MRN: [id number]  PATIENT NAME: Damari Vlilalta  YOB: 1985  PCP: BAYLEE Willard CNP    Damari Villalta is a 35 year old female who is being evaluated via a billable telephone visit due to COVID-19 precautions.      The patient has been notified of following:     \"This telephone visit will be conducted via a call between you and your physician/provider. We have found that certain health care needs can be provided without the need for a physical exam.  This service lets us provide the care you need with a short phone conversation.  If a prescription is necessary we can send it directly to your pharmacy.  If lab work is needed we can place an order for that and you can then stop by our lab to have the test done at a later time.    If during the course of the call the physician/provider feels a telephone visit is not appropriate, you will not be charged for this service.\"     Damari Villalta complains of    Chief Complaint   Patient presents with     Follow Up     HA- stable      I have reviewed and updated the patient's Past Medical History, Social History, Family History and Medication List.    ALLERGIES  Nkda [no known drug allergies]    The consent for this service was obtained by:  Sabas Larry  (MA signature)    Additional provider notes:    REASON FOR VISIT:   Chief Complaint   Patient presents with     Follow Up     HA- stable      SUBJECTIVE:                                                      HISTORY OF PRESENT ILLNESS: This telephone encounter is regarding multifactorial headache disorder.  Last virtual visit was done via phone on 4/24/2020 due to COVID-19 precautions.  She reported good headache control.  No medication changes were made.  Please refer to my initial/other prior notes for further information.    Since the last visit, the patient reports that " her headache is stable.  We reviewed her headache diary together.  In June she had 8 headaches, then in July, August, September, and October she had 4 migraines each month.  In November she had 3 headaches.  She feels that headaches improved after she added Zyrtec for her allergy.    For headache prevention she is on 75 mg of Effexor daily, magnesium, and riboflavin.  She uses Tylenol/Imitrex for acute therapy.  They work well.  No significant side effects.  No interval development of new focal neurological symptoms    On review of systems, patient endorses no other active complaints.   ASSESSMENT AND PLAN:                                                    Assessment: 35 year old female patient, who due to COVID-19 precautions is evaluated via a telephone visit for multifactorial headache disorder.  She reports good headache control on preventive Effexor and Imitrex/Tylenol, used for acute therapy.  We had a detailed discussion with the patient regarding her current symptoms, available treatment options, and the plan.  We reviewed again that we could further increase the dose of Effexor, try different antidepressant, such as nortriptyline, amitriptyline, or doxepin, versus trial of Topamax, gabapentin, Depakote, and CGRP antagonists.  Acute treatment options that were reviewed today include different triptans, Reyvow, Nurtec, and Ubrelvy as well as naproxen.  After extensive discussion, we decided not to make any medication changes.    Diagnoses:    ICD-10-CM    1. Migraine without aura and without status migrainosus, not intractable  G43.009    2. Tension headache  G44.209      Plan: At today's visit we thoroughly discussed current symptoms, available treatment options, and the plan.  I am pleased to hear that her headaches improved. We decided not to make any medication changes.  Effexor and Imitrex were refilled.    I instructed the patient to keep the headache diary and bring it to the next follow-up visit or  upload via My Chart.    Next follow-up appointment is in the next 4 months or earlier if needed.    I have reviewed the note as documented above.  This accurately captures the substance of my conversation with the patient.    Phone call contact time: 23 minutes.    Santi Hess MD  HP/ Neurology.

## 2020-11-27 NOTE — PATIENT INSTRUCTIONS
AFTER VISIT SUMMARY (AVS):    At today's visit we thoroughly discussed current symptoms, available treatment options, and the plan.  I am pleased to hear that your headaches improved. We decided not to make any medication changes.  Effexor and Imitrex were refilled.    Please keep the headache diary and bring it to the next follow-up visit or upload via My Chart.    Next follow-up appointment is in the next 4 months or earlier if needed.    Please do not hesitate to call me with any questions or concerns.    Thanks.

## 2020-12-01 ENCOUNTER — VIRTUAL VISIT (OUTPATIENT)
Dept: NEUROLOGY | Facility: CLINIC | Age: 35
End: 2020-12-01
Payer: COMMERCIAL

## 2020-12-01 DIAGNOSIS — G43.009 MIGRAINE WITHOUT AURA AND WITHOUT STATUS MIGRAINOSUS, NOT INTRACTABLE: Primary | ICD-10-CM

## 2020-12-01 DIAGNOSIS — G44.209 TENSION HEADACHE: ICD-10-CM

## 2020-12-01 PROCEDURE — 99213 OFFICE O/P EST LOW 20 MIN: CPT | Mod: TEL | Performed by: PSYCHIATRY & NEUROLOGY

## 2020-12-01 RX ORDER — SUMATRIPTAN 50 MG/1
50 TABLET, FILM COATED ORAL
Qty: 9 TABLET | Refills: 5 | Status: SHIPPED | OUTPATIENT
Start: 2020-12-01 | End: 2021-08-05

## 2020-12-01 RX ORDER — VENLAFAXINE HYDROCHLORIDE 75 MG/1
75 CAPSULE, EXTENDED RELEASE ORAL DAILY
Qty: 90 CAPSULE | Refills: 1 | Status: SHIPPED | OUTPATIENT
Start: 2020-12-01 | End: 2021-08-05

## 2020-12-01 NOTE — LETTER
"    12/1/2020         RE: Damari Villalta  [address]  [place] [address]        Dear Colleague,    Thank you for referring your patient, Damari Villalta, to the [facility] [place]. Please see a copy of my visit note below.    TELEPHONE ENCOUNTER NOTE    DATE OF VISIT: 12/1/2020  CLINIC LOCATION: [facility]  MRN: [id number]  PATIENT NAME: Damari Villalta  YOB: 1985  PCP: BAYLEE Willard CNP    Damari Villalta is a 35 year old female who is being evaluated via a billable telephone visit due to COVID-19 precautions.      The patient has been notified of following:     \"This telephone visit will be conducted via a call between you and your physician/provider. We have found that certain health care needs can be provided without the need for a physical exam.  This service lets us provide the care you need with a short phone conversation.  If a prescription is necessary we can send it directly to your pharmacy.  If lab work is needed we can place an order for that and you can then stop by our lab to have the test done at a later time.    If during the course of the call the physician/provider feels a telephone visit is not appropriate, you will not be charged for this service.\"     Damari Villalta complains of    Chief Complaint   Patient presents with     Follow Up     HA- stable      I have reviewed and updated the patient's Past Medical History, Social History, Family History and Medication List.    ALLERGIES  Nkda [no known drug allergies]    The consent for this service was obtained by:  Sabas Larry  (MA signature)    Additional provider notes:    REASON FOR VISIT:   Chief Complaint   Patient presents with     Follow Up     HA- stable      SUBJECTIVE:                                                      HISTORY OF PRESENT ILLNESS: This telephone encounter is regarding multifactorial headache disorder.  " Last virtual visit was done via phone on 4/24/2020 due to COVID-19 precautions.  She reported good headache control.  No medication changes were made.  Please refer to my initial/other prior notes for further information.    Since the last visit, the patient reports that her headache is stable.  We reviewed her headache diary together.  In June she had 8 headaches, then in July, August, September, and October she had 4 migraines each month.  In November she had 3 headaches.  She feels that headaches improved after she added Zyrtec for her allergy.    For headache prevention she is on 75 mg of Effexor daily, magnesium, and riboflavin.  She uses Tylenol/Imitrex for acute therapy.  They work well.  No significant side effects.  No interval development of new focal neurological symptoms    On review of systems, patient endorses no other active complaints.   ASSESSMENT AND PLAN:                                                    Assessment: 35 year old female patient, who due to COVID-19 precautions is evaluated via a telephone visit for multifactorial headache disorder.  She reports good headache control on preventive Effexor and Imitrex/Tylenol, used for acute therapy.  We had a detailed discussion with the patient regarding her current symptoms, available treatment options, and the plan.  We reviewed again that we could further increase the dose of Effexor, try different antidepressant, such as nortriptyline, amitriptyline, or doxepin, versus trial of Topamax, gabapentin, Depakote, and CGRP antagonists.  Acute treatment options that were reviewed today include different triptans, Reyvow, Nurtec, and Ubrelvy as well as naproxen.  After extensive discussion, we decided not to make any medication changes.    Diagnoses:    ICD-10-CM    1. Migraine without aura and without status migrainosus, not intractable  G43.009    2. Tension headache  G44.209      Plan: At today's visit we thoroughly discussed current symptoms,  available treatment options, and the plan.  I am pleased to hear that her headaches improved. We decided not to make any medication changes.  Effexor and Imitrex were refilled.    I instructed the patient to keep the headache diary and bring it to the next follow-up visit or upload via My Chart.    Next follow-up appointment is in the next 4 months or earlier if needed.    I have reviewed the note as documented above.  This accurately captures the substance of my conversation with the patient.    Phone call contact time: 23 minutes.    Santi Hess MD  / Neurology.        Again, thank you for allowing me to participate in the care of your patient.        Sincerely,        Santi Hess MD

## 2021-01-14 ENCOUNTER — HEALTH MAINTENANCE LETTER (OUTPATIENT)
Age: 36
End: 2021-01-14

## 2021-05-08 ENCOUNTER — HEALTH MAINTENANCE LETTER (OUTPATIENT)
Age: 36
End: 2021-05-08

## 2021-08-05 ENCOUNTER — OFFICE VISIT (OUTPATIENT)
Dept: NEUROLOGY | Facility: CLINIC | Age: 36
End: 2021-08-05
Attending: PSYCHIATRY & NEUROLOGY
Payer: COMMERCIAL

## 2021-08-05 VITALS — SYSTOLIC BLOOD PRESSURE: 107 MMHG | DIASTOLIC BLOOD PRESSURE: 73 MMHG | OXYGEN SATURATION: 98 % | HEART RATE: 77 BPM

## 2021-08-05 DIAGNOSIS — G44.209 TENSION HEADACHE: ICD-10-CM

## 2021-08-05 DIAGNOSIS — G43.009 MIGRAINE WITHOUT AURA AND WITHOUT STATUS MIGRAINOSUS, NOT INTRACTABLE: Primary | ICD-10-CM

## 2021-08-05 PROCEDURE — G0463 HOSPITAL OUTPT CLINIC VISIT: HCPCS

## 2021-08-05 PROCEDURE — 99214 OFFICE O/P EST MOD 30 MIN: CPT | Performed by: PSYCHIATRY & NEUROLOGY

## 2021-08-05 RX ORDER — VENLAFAXINE HYDROCHLORIDE 37.5 MG/1
37.5 CAPSULE, EXTENDED RELEASE ORAL DAILY
Qty: 91 CAPSULE | Refills: 1 | Status: SHIPPED | OUTPATIENT
Start: 2021-08-05 | End: 2022-02-11

## 2021-08-05 RX ORDER — SUMATRIPTAN 50 MG/1
50 TABLET, FILM COATED ORAL
Qty: 9 TABLET | Refills: 5 | Status: SHIPPED | OUTPATIENT
Start: 2021-08-05 | End: 2022-02-11

## 2021-08-05 NOTE — LETTER
8/5/2021         RE: Damari Villalta  [address]  [place] [address]        Dear Colleague,    Thank you for referring your patient, Damari Villalta, to the [facility] [place]. Please see a copy of my visit note below.    ESTABLISHED PATIENT NEUROLOGY NOTE    DATE OF VISIT: 8/5/2021  CLINIC LOCATION: [facility]  MRN: [id number]  PATIENT NAME: Damari Villalta  YOB: 1985    PCP: BAYLEE Willard CNP    REASON FOR VISIT:   Chief Complaint   Patient presents with     Headache     f/u     SUBJECTIVE:                                                      HISTORY OF PRESENT ILLNESS: Patient is here to follow up regarding multifactorial headache disorder.  The last visit on 12/1/2020 was done virtually due to COVID-19 precautions.  At that time no medication changes were made.  Please refer to my initial/other prior notes for further information.    Since the last visit, the patient reports that her headaches are stable (less frequent and severe in her opinion). We reviewed her headache diary together.  In May she had 6 headaches, in June and July she had 4 headache attacks each month.  For headache prevention she is on 75 mg of Effexor daily, magnesium, and riboflavin.  She uses ibuprofen/Imitrex for acute therapy.  They work well without significant side effects.  No interval development of new focal neurological symptoms.  Wants to ask about Topamax, but that was suggested by one of her relatives.    On review of systems, patient endorses no additional active complaints. Medications, allergies, family and social history were also reviewed. There are no changes reported by patient.  REVIEW OF SYSTEMS:                                                    10-system review was completed. Pertinent positives are included in HPI. The remainder of ROS is negative.  EXAM:                                                    Physical Exam:    Vitals: /73   Pulse 77   SpO2 98%     General: pt is in NAD, cooperative.  Skin: normal turgor, moist mucous membranes, no lesions/rashes noticed.  HEENT: ATNC, white sclera, normal conjunctiva.  Respiratory: Symmetric lung excursion, no accessory respiratory muscle use.  Abdomen: Non distended.  Neurological: awake, cooperative, follows commands, no aphasia or dysarthria noted, cranial nerves II-XII: no ptosis, face is symmetric, equally moves all extremities, no dysmetria bilaterally, casual gait is normal.  ASSESSMENT AND PLAN:                                                    Assessment: 35-year-old female patient with multifactorial headache disorder presents for follow-up.  She reports that her headaches are stable on preventive Effexor and ibuprofen/Imitrex use for acute therapy.  Previously we discussed that we could further increase Effexor dose if necessary, try different antidepressant (nortriptyline, amitriptyline, doxepin), Topamax, gabapentin, Depakote, and CGRP antagonists.  For acute therapy she could try naproxen, Reyvow, Nurtec, or Ubrelvy.    Today, we reviewed all of these options again, but the patient is wondering if she could reduce Effexor to see if her headaches remain under control.  She does not think that increasing the dose Effexor from 37.5 to 75 mg made a big difference and is willing to reduce the dose.  I sent an updated prescription for Effexor to her pharmacy.  I also renewed Imitrex.    Diagnoses:    ICD-10-CM    1. Migraine without aura and without status migrainosus, not intractable  G43.009    2. Tension headache  G44.209      Plan: At today's visit we thoroughly discussed current symptoms, available treatment options, and the plan.  I am pleased to hear that patient's headaches are well controlled at the present time.    We decided to reduce the dose of Effexor to 37.5 mg every morning.  The patient was advised to contact my clinic if her headaches worsen.  I also  renewed her Imitrex prescription.    I instructed the patient to keep the headache diary and bring it to the next follow-up visit or upload via My Chart.    Next follow-up appointment is in the next 4 months or earlier if needed.    Total Time: 31 minutes spent on the date of the encounter doing chart review, history and exam, documentation and further activities per the note.    Santi Hess MD  [facility] Neurology  (Chart documentation was completed in part with Dragon voice-recognition software. Even though reviewed, some grammatical, spelling, and word errors may remain.)      Again, thank you for allowing me to participate in the care of your patient.        Sincerely,        Santi Hess MD

## 2021-08-05 NOTE — PATIENT INSTRUCTIONS
AFTER VISIT SUMMARY (AVS):    At today's visit we thoroughly discussed current symptoms, available treatment options, and the plan.  I am pleased to hear that your headaches are well controlled at the present time.    We decided to reduce the dose of Effexor to 37.5 mg every morning.  Please contact my clinic if your headaches worsen.  I also renewed your Imitrex prescription.    Please keep the headache diary and bring it to the next follow-up visit or upload via My Chart.    Next follow-up appointment is in the next 4 months or earlier if needed.    Please do not hesitate to call me with any questions or concerns.    Thanks.

## 2021-08-05 NOTE — PROGRESS NOTES
ESTABLISHED PATIENT NEUROLOGY NOTE    DATE OF VISIT: 8/5/2021  CLINIC LOCATION: [facility]  MRN: [id number]  PATIENT NAME: Damari Villalta  YOB: 1985    PCP: BAYLEE Wlilard CNP    REASON FOR VISIT:   Chief Complaint   Patient presents with     Headache     f/u     SUBJECTIVE:                                                      HISTORY OF PRESENT ILLNESS: Patient is here to follow up regarding multifactorial headache disorder.  The last visit on 12/1/2020 was done virtually due to COVID-19 precautions.  At that time no medication changes were made.  Please refer to my initial/other prior notes for further information.    Since the last visit, the patient reports that her headaches are stable (less frequent and severe in her opinion). We reviewed her headache diary together.  In May she had 6 headaches, in June and July she had 4 headache attacks each month.  For headache prevention she is on 75 mg of Effexor daily, magnesium, and riboflavin.  She uses ibuprofen/Imitrex for acute therapy.  They work well without significant side effects.  No interval development of new focal neurological symptoms.  Wants to ask about Topamax that was suggested by one of her relatives.    On review of systems, patient endorses no additional active complaints. Medications, allergies, family and social history were also reviewed. There are no changes reported by patient.  REVIEW OF SYSTEMS:                                                    10-system review was completed. Pertinent positives are included in HPI. The remainder of ROS is negative.  EXAM:                                                    Physical Exam:   Vitals: /73   Pulse 77   SpO2 98%     General: pt is in NAD, cooperative.  Skin: normal turgor, moist mucous membranes, no lesions/rashes noticed.  HEENT: ATNC, white sclera, normal conjunctiva.  Respiratory: Symmetric lung excursion, no accessory respiratory  muscle use.  Abdomen: Non distended.  Neurological: awake, cooperative, follows commands, no aphasia or dysarthria noted, cranial nerves II-XII: no ptosis, face is symmetric, equally moves all extremities, no dysmetria bilaterally, casual gait is normal.  ASSESSMENT AND PLAN:                                                    Assessment: 35-year-old female patient with multifactorial headache disorder presents for follow-up.  She reports that her headaches are stable on preventive Effexor and ibuprofen/Imitrex use for acute therapy.  Previously we discussed that we could further increase Effexor dose if necessary, try different antidepressant (nortriptyline, amitriptyline, doxepin), Topamax, gabapentin, Depakote, and CGRP antagonists.  For acute therapy she could try naproxen, Reyvow, Nurtec, or Ubrelvy.    Today, we reviewed all of these options again, but the patient is wondering if she could reduce Effexor to see if her headaches remain under control.  She does not think that increasing the dose Effexor from 37.5 to 75 mg made a big difference and is willing to reduce the dose.  I sent an updated prescription for Effexor to her pharmacy.  I also renewed Imitrex.    Diagnoses:    ICD-10-CM    1. Migraine without aura and without status migrainosus, not intractable  G43.009    2. Tension headache  G44.209      Plan: At today's visit we thoroughly discussed current symptoms, available treatment options, and the plan.  I am pleased to hear that patient's headaches are well controlled at the present time.    We decided to reduce the dose of Effexor to 37.5 mg every morning.  The patient was advised to contact my clinic if her headaches worsen.  I also renewed her Imitrex prescription.    I instructed the patient to keep the headache diary and bring it to the next follow-up visit or upload via My Chart.    Next follow-up appointment is in the next 4 months or earlier if needed.    Total Time: 31 minutes spent on the  date of the encounter doing chart review, history and exam, documentation and further activities per the note.    Santi Hess MD  [facility] Neurology  (Chart documentation was completed in part with Dragon voice-recognition software. Even though reviewed, some grammatical, spelling, and word errors may remain.)

## 2021-10-23 ENCOUNTER — HEALTH MAINTENANCE LETTER (OUTPATIENT)
Age: 36
End: 2021-10-23

## 2022-02-11 ENCOUNTER — OFFICE VISIT (OUTPATIENT)
Dept: NEUROLOGY | Facility: CLINIC | Age: 37
End: 2022-02-11
Payer: COMMERCIAL

## 2022-02-11 VITALS — HEART RATE: 55 BPM | SYSTOLIC BLOOD PRESSURE: 111 MMHG | DIASTOLIC BLOOD PRESSURE: 65 MMHG | OXYGEN SATURATION: 99 %

## 2022-02-11 DIAGNOSIS — G44.209 TENSION HEADACHE: ICD-10-CM

## 2022-02-11 DIAGNOSIS — G43.009 MIGRAINE WITHOUT AURA AND WITHOUT STATUS MIGRAINOSUS, NOT INTRACTABLE: Primary | ICD-10-CM

## 2022-02-11 PROCEDURE — 99214 OFFICE O/P EST MOD 30 MIN: CPT | Performed by: PSYCHIATRY & NEUROLOGY

## 2022-02-11 RX ORDER — SUMATRIPTAN 50 MG/1
50 TABLET, FILM COATED ORAL
Qty: 9 TABLET | Refills: 3 | Status: SHIPPED | OUTPATIENT
Start: 2022-03-01 | End: 2022-08-11

## 2022-02-11 NOTE — PROGRESS NOTES
ESTABLISHED PATIENT NEUROLOGY NOTE    DATE OF VISIT: 2/11/2022  CLINIC LOCATION: [facility]  MRN: [id number]  PATIENT NAME: Damari Villalta  YOB: 1985    REASON FOR VISIT:   Chief Complaint   Patient presents with     Headache     Follow up      SUBJECTIVE:                                                      HISTORY OF PRESENT ILLNESS: Patient is here to follow up regarding multifactorial headaches.  Last seen on 8/5/2021.  At that time we decided to reduce the dose of Effexor.  Please refer to my initial/other prior notes for further information.    Since the last visit, the patient reports that her headaches continue to be stable.  She did not keep diary, but reports 1-2 migraines during her period and 1-3 in between over the next 3 weeks.  She successfully reduced her Effexor from 75 to 37.5 mg daily.  In mid December she went completely off it because she was thinking that she got pregnant, which was not the case.  She did not restart it afterwards.  She had mild withdrawal symptoms, but they subsided.  Still on Ronny-relief. For acute therapy she uses Imitrex in combination with ibuprofen.  She denies interval development of new focal neurological symptoms.  EXAM:                                                    Physical Exam:   Vitals: /65 (BP Location: Right arm, Patient Position: Sitting, Cuff Size: Adult Regular)   Pulse 55   SpO2 99%     General: pt is in NAD, cooperative.  Skin: normal turgor, moist mucous membranes, no lesions/rashes noticed.  HEENT: ATNC, white sclera, normal conjunctiva.  Respiratory: Symmetric lung excursion, no accessory respiratory muscle use.  Abdomen: Non distended.  Neurological: awake, cooperative, follows commands, no aphasia or dysarthria noted, cranial nerves II-XII: no ptosis, face is symmetric, equally moves all extremities, no dysmetria bilaterally, casual gait is normal.  ASSESSMENT AND PLAN:                                                     Assessment: 36 year old female patient presents for follow-up of migraine and tension headaches.  She reports that her headaches remain stable after reducing the dose of Effexor.  In December she completely stopped it thinking that she got pregnant, which was not the case.  She did not restart it afterwards.  Still takes Ronny-relief.  She reports 2-5 headaches per month.  Ibuprofen/Imitrex work well for acute therapy.  In the past we discussed additional treatment options that include nortriptyline, amitriptyline, doxepin, cyproheptadine, Topamax, gabapentin, Depakote, and CGRP antagonists.  We also reviewed a few additional options for acute therapy.    Today, we discussed potentially restarting Effexor versus trial of different preventive medication, but decided to see how the next 3 months ago.  She will continue Ronny-relief.  I updated her prescription for Imitrex.    Diagnoses:    ICD-10-CM    1. Migraine without aura and without status migrainosus, not intractable  G43.009    2. Tension headache  G44.209      Plan: At today's visit we thoroughly discussed current symptoms, available treatment options, and the plan.    We decided not to restart Effexor while she continues Ronny-relief for headache prevention.  I refilled her prescription for Imitrex.  No other medication changes, but we discussed that if she notices that her headaches are getting more intense or frequent, she should let me know to add additional medication for preventive therapy.    I advised the patient to keep the headache diary and bring it to the next follow-up visit or upload via My Chart.    Next follow-up appointment is in the next 3 months or earlier if needed.    Total Time: 26 minutes spent on the date of the encounter doing chart review, history and exam, documentation and further activities per the note.    Santi Hess MD  [facility] Neurology  (Chart documentation was completed in part with [employer]  voice-recognition software. Even though reviewed, some grammatical, spelling, and word errors may remain.)

## 2022-02-11 NOTE — PROGRESS NOTES
"Damari Villalta is a 36 year old female who presents for:  Chief Complaint   Patient presents with     Headache     Follow up         Initial Vitals:  /65 (BP Location: Right arm, Patient Position: Sitting, Cuff Size: Adult Regular)   Pulse 55   SpO2 99%  Estimated body mass index is 21.29 kg/m  as calculated from the following:    Height as of 1/31/19: 1.638 m (5' 4.5\").    Weight as of 1/24/20: 57.2 kg (126 lb).. There is no height or weight on file to calculate BSA. BP completed using cuff size: regular    Nursing Comments:     Lydia Johnson  "

## 2022-02-11 NOTE — LETTER
2/11/2022         RE: Damari Villalta  [address]  [place] [address]        Dear Colleague,    Thank you for referring your patient, Damari Villalta, to the [facility] NEUROLOGY CLINICS [facility]. Please see a copy of my visit note below.    ESTABLISHED PATIENT NEUROLOGY NOTE    DATE OF VISIT: 2/11/2022  CLINIC LOCATION: [facility]  MRN: [id number]  PATIENT NAME: Damari Villalta  YOB: 1985    REASON FOR VISIT:   Chief Complaint   Patient presents with     Headache     Follow up      SUBJECTIVE:                                                      HISTORY OF PRESENT ILLNESS: Patient is here to follow up regarding multifactorial headaches.  Last seen on 8/5/2021.  At that time we decided to reduce the dose of Effexor.  Please refer to my initial/other prior notes for further information.    Since the last visit, the patient reports that her headaches continue to be stable.  She did not keep diary, but reports 1-2 migraines during her period and 1-3 in between over the next 3 weeks.  She successfully reduced her Effexor from 75 to 37.5 mg daily.  In mid December she went completely off it because she was thinking that she got pregnant, which was not the case.  She did not restart it afterwards.  She had mild withdrawal symptoms, but they subsided.  Still on Ronny-relief. For acute therapy she uses Imitrex in combination with ibuprofen.  She denies interval development of new focal neurological symptoms.  EXAM:                                                    Physical Exam:   Vitals: /65 (BP Location: Right arm, Patient Position: Sitting, Cuff Size: Adult Regular)   Pulse 55   SpO2 99%     General: pt is in NAD, cooperative.  Skin: normal turgor, moist mucous membranes, no lesions/rashes noticed.  HEENT: ATNC, white sclera, normal conjunctiva.  Respiratory: Symmetric lung excursion, no accessory respiratory muscle use.  Abdomen: Non  distended.  Neurological: awake, cooperative, follows commands, no aphasia or dysarthria noted, cranial nerves II-XII: no ptosis, face is symmetric, equally moves all extremities, no dysmetria bilaterally, casual gait is normal.  ASSESSMENT AND PLAN:                                                    Assessment: 36 year old female patient presents for follow-up of migraine and tension headaches.  She reports that her headaches remain stable after reducing the dose of Effexor.  In December she completely stopped it thinking that she got pregnant, which was not the case.  She did not restart it afterwards.  Still takes Ronny-relief.  She reports 2-5 headaches per month.  Ibuprofen/Imitrex work well for acute therapy.  In the past we discussed additional treatment options that include nortriptyline, amitriptyline, doxepin, cyproheptadine, Topamax, gabapentin, Depakote, and CGRP antagonists.  We also reviewed a few additional options for acute therapy.    Today, we discussed potentially restarting Effexor versus trial of different preventive medication, but decided to see how the next 3 months ago.  She will continue Ronny-relief.  I updated her prescription for Imitrex.    Diagnoses:    ICD-10-CM    1. Migraine without aura and without status migrainosus, not intractable  G43.009    2. Tension headache  G44.209      Plan: At today's visit we thoroughly discussed current symptoms, available treatment options, and the plan.    We decided not to restart Effexor while she continues Ronny-relief for headache prevention.  I refilled her prescription for Imitrex.  No other medication changes, but we discussed that if she notices that her headaches are getting more intense or frequent, she should let me know to add additional medication for preventive therapy.    I advised the patient to keep the headache diary and bring it to the next follow-up visit or upload via My Chart.    Next follow-up appointment is in the next 3 months or  "earlier if needed.    Total Time: 26 minutes spent on the date of the encounter doing chart review, history and exam, documentation and further activities per the note.    Santi Hess MD  [facility] Neurology  (Chart documentation was completed in part with Dragon voice-recognition software. Even though reviewed, some grammatical, spelling, and word errors may remain.)      Damari Villalta is a 36 year old female who presents for:  Chief Complaint   Patient presents with     Headache     Follow up         Initial Vitals:  /65 (BP Location: Right arm, Patient Position: Sitting, Cuff Size: Adult Regular)   Pulse 55   SpO2 99%  Estimated body mass index is 21.29 kg/m  as calculated from the following:    Height as of 1/31/19: 1.638 m (5' 4.5\").    Weight as of 1/24/20: 57.2 kg (126 lb).. There is no height or weight on file to calculate BSA. BP completed using cuff size: regular    Nursing Comments:     Lydia Johnson      Again, thank you for allowing me to participate in the care of your patient.        Sincerely,        Santi Hess MD    "

## 2022-02-11 NOTE — PATIENT INSTRUCTIONS
AFTER VISIT SUMMARY (AVS):    At today's visit we thoroughly discussed current symptoms, available treatment options, and the plan.    We decided not to restart Effexor while you continue Ronny-relief for headache prevention.  I refilled your prescription for Imitrex.      No other medication changes, but if you notice that your headaches are getting more intense or frequent, please let me know to add additional medication for preventive therapy.    Please keep the headache diary and bring it to the next follow-up visit or upload via My Chart.    Next follow-up appointment is in the next 3 months or earlier if needed.    Please do not hesitate to call me with any questions or concerns.    Thanks.

## 2022-06-04 ENCOUNTER — HEALTH MAINTENANCE LETTER (OUTPATIENT)
Age: 37
End: 2022-06-04

## 2022-08-11 ENCOUNTER — OFFICE VISIT (OUTPATIENT)
Dept: NEUROLOGY | Facility: CLINIC | Age: 37
End: 2022-08-11
Payer: COMMERCIAL

## 2022-08-11 VITALS — SYSTOLIC BLOOD PRESSURE: 106 MMHG | HEART RATE: 85 BPM | DIASTOLIC BLOOD PRESSURE: 68 MMHG | OXYGEN SATURATION: 99 %

## 2022-08-11 DIAGNOSIS — G44.209 TENSION HEADACHE: ICD-10-CM

## 2022-08-11 DIAGNOSIS — G43.009 MIGRAINE WITHOUT AURA AND WITHOUT STATUS MIGRAINOSUS, NOT INTRACTABLE: Primary | ICD-10-CM

## 2022-08-11 PROCEDURE — 99213 OFFICE O/P EST LOW 20 MIN: CPT | Performed by: PSYCHIATRY & NEUROLOGY

## 2022-08-11 RX ORDER — SUMATRIPTAN 50 MG/1
50 TABLET, FILM COATED ORAL
Qty: 9 TABLET | Refills: 3 | Status: SHIPPED | OUTPATIENT
Start: 2022-08-11 | End: 2023-09-22

## 2022-08-11 NOTE — PROGRESS NOTES
"Damari Villalta is a 36 year old female who presents for:  Chief Complaint   Patient presents with     Migraine     Patient said migraines are less frequent        Initial Vitals:  /68 (BP Location: Left arm, Patient Position: Sitting, Cuff Size: Adult Regular)   Pulse 85   SpO2 99%  Estimated body mass index is 21.29 kg/m  as calculated from the following:    Height as of 1/31/19: 1.638 m (5' 4.5\").    Weight as of 1/24/20: 57.2 kg (126 lb).. There is no height or weight on file to calculate BSA. BP completed using cuff size: regular      Lydia Johnson  "

## 2022-08-11 NOTE — PATIENT INSTRUCTIONS
AFTER VISIT SUMMARY (AVS):    At today's visit we thoroughly discussed current symptoms, available treatment options, and the plan.  I am pleased to hear that your headaches improved in frequency.    We decided not to make any medication changes.  I refilled your Imitrex.    Please keep the headache diary and bring it to the next follow-up visit or upload via My Chart.     Next follow-up appointment is in the next 6 months or earlier if needed.    Please do not hesitate to call me with any questions or concerns.    Thanks.

## 2022-08-11 NOTE — LETTER
8/11/2022         RE: Damari Villalta  [address]  [place] [address]        Dear Colleague,    Thank you for referring your patient, Damari Villalta, to the [facility] NEUROLOGY CLINICS [facility]. Please see a copy of my visit note below.    ESTABLISHED PATIENT NEUROLOGY NOTE    DATE OF VISIT: 8/11/2022  CLINIC LOCATION: [facility]  MRN: [id number]  PATIENT NAME: Damari Villalta  YOB: 1985    REASON FOR VISIT:   Chief Complaint   Patient presents with     Migraine     Patient said migraines are less frequent     SUBJECTIVE:                                                      HISTORY OF PRESENT ILLNESS: Patient is here to follow up regarding multifactorial headaches.  The last visit was on 2/11/2022.  At that time we decided not to restart Effexor.  No other medication changes were made.  Please refer to my initial/other prior notes for further information.    Since the last visit, the patient reports reduced headache frequency.  She approximates 3-4 migraines per month.  Does not keep diary.  She stopped Ronny-relief, but does not think that it led to headache worsening.  For acute therapy she uses Imitrex, which works well.  No significant side effects or interval development of new focal neurological symptoms.  EXAM:                                                    Physical Exam:   Vitals: /68 (BP Location: Left arm, Patient Position: Sitting, Cuff Size: Adult Regular)   Pulse 85   SpO2 99%     General: pt is in NAD, cooperative.  Skin: normal turgor, moist mucous membranes, no lesions/rashes noticed.  HEENT: ATNC, white sclera, normal conjunctiva.  Respiratory: Symmetric lung excursion, no accessory respiratory muscle use.  Abdomen: Non distended.  Neurological: awake, cooperative, follows commands, no exam changes compared to previous visits.  ASSESSMENT AND PLAN:                                                    Assessment: 36 year old  female patient presents for follow-up of migraine and tension headaches.  She reports that her headaches improved (reduced in frequency).  In the past we discussed additional treatment options that include retrial of Effexor versus trial of nortriptyline, amitriptyline, cyproheptadine, doxepin, Topamax, gabapentin, Depakote, and CGRP antagonists.  We decided not to start any preventative medications at the present time.  I refilled her Imitrex.    Diagnoses:    ICD-10-CM    1. Migraine without aura and without status migrainosus, not intractable  G43.009    2. Tension headache  G44.209      Plan: At today's visit we thoroughly discussed current symptoms, available treatment options, and the plan.  I am pleased to hear that her headaches improved in frequency.    We decided not to make any medication changes.  I refilled Imitrex.    I recommended the patient to keep the headache diary and bring it to the next follow-up visit or upload via My Chart.     Next follow-up appointment is in the next 6 months or earlier if needed.    Total Time: 12 minutes spent on the date of the encounter doing chart review, history and exam, documentation and further activities per the note.    Santi Hess MD  [facility] Neurology  (Chart documentation was completed in part with Dragon voice-recognition software. Even though reviewed, some grammatical, spelling, and word errors may remain.)        Again, thank you for allowing me to participate in the care of your patient.        Sincerely,        Santi Hess MD

## 2022-08-11 NOTE — PROGRESS NOTES
ESTABLISHED PATIENT NEUROLOGY NOTE    DATE OF VISIT: 8/11/2022  CLINIC LOCATION: [facility]  MRN: [id number]  PATIENT NAME: Damari Villalta  YOB: 1985    REASON FOR VISIT:   Chief Complaint   Patient presents with     Migraine     Patient said migraines are less frequent     SUBJECTIVE:                                                      HISTORY OF PRESENT ILLNESS: Patient is here to follow up regarding multifactorial headaches.  The last visit was on 2/11/2022.  At that time we decided not to restart Effexor.  No other medication changes were made.  Please refer to my initial/other prior notes for further information.    Since the last visit, the patient reports reduced headache frequency.  She approximates 3-4 migraines per month.  Does not keep diary.  She stopped Ronny-relief, but does not think that it led to headache worsening.  For acute therapy she uses Imitrex, which works well.  No significant side effects or interval development of new focal neurological symptoms.  EXAM:                                                    Physical Exam:   Vitals: /68 (BP Location: Left arm, Patient Position: Sitting, Cuff Size: Adult Regular)   Pulse 85   SpO2 99%     General: pt is in NAD, cooperative.  Skin: normal turgor, moist mucous membranes, no lesions/rashes noticed.  HEENT: ATNC, white sclera, normal conjunctiva.  Respiratory: Symmetric lung excursion, no accessory respiratory muscle use.  Abdomen: Non distended.  Neurological: awake, cooperative, follows commands, no exam changes compared to previous visits.  ASSESSMENT AND PLAN:                                                    Assessment: 36 year old female patient presents for follow-up of migraine and tension headaches.  She reports that her headaches improved (reduced in frequency).  In the past we discussed additional treatment options that include retrial of Effexor versus trial of nortriptyline,  amitriptyline, cyproheptadine, doxepin, Topamax, gabapentin, Depakote, and CGRP antagonists.  We decided not to start any preventative medications at the present time.  I refilled her Imitrex.    Diagnoses:    ICD-10-CM    1. Migraine without aura and without status migrainosus, not intractable  G43.009    2. Tension headache  G44.209      Plan: At today's visit we thoroughly discussed current symptoms, available treatment options, and the plan.  I am pleased to hear that her headaches improved in frequency.    We decided not to make any medication changes.  I refilled Imitrex.    I recommended the patient to keep the headache diary and bring it to the next follow-up visit or upload via My Chart.     Next follow-up appointment is in the next 6 months or earlier if needed.    Total Time: 12 minutes spent on the date of the encounter doing chart review, history and exam, documentation and further activities per the note.    Santi Hess MD  [facility] Neurology  (Chart documentation was completed in part with Dragon voice-recognition software. Even though reviewed, some grammatical, spelling, and word errors may remain.)

## 2022-10-10 ENCOUNTER — HEALTH MAINTENANCE LETTER (OUTPATIENT)
Age: 37
End: 2022-10-10

## 2022-11-22 NOTE — TELEPHONE ENCOUNTER
Routing refill request to provider for review/approval because:  Requesting before month is over - use of >8 treatments         Cheek Interpolation Flap Text: A decision was made to reconstruct the defect utilizing an interpolation axial flap and a staged reconstruction.  A telfa template was made of the defect.  This telfa template was then used to outline the Cheek Interpolation flap.  The donor area for the pedicle flap was then injected with anesthesia.  The flap was excised through the skin and subcutaneous tissue down to the layer of the underlying musculature.  The interpolation flap was carefully excised within this deep plane to maintain its blood supply.  The edges of the donor site were undermined.   The donor site was closed in a primary fashion.  The pedicle was then rotated into position and sutured.  Once the tube was sutured into place, adequate blood supply was confirmed with blanching and refill.  The pedicle was then wrapped with xeroform gauze and dressed appropriately with a telfa and gauze bandage to ensure continued blood supply and protect the attached pedicle.

## 2023-06-11 ENCOUNTER — HEALTH MAINTENANCE LETTER (OUTPATIENT)
Age: 38
End: 2023-06-11

## 2023-08-02 ENCOUNTER — TELEPHONE (OUTPATIENT)
Dept: NEUROLOGY | Facility: CLINIC | Age: 38
End: 2023-08-02
Payer: COMMERCIAL

## 2023-08-02 NOTE — TELEPHONE ENCOUNTER
Left message for patient to call the clinic regarding the virtual visit with Dr Hess in September.  Dr. Hess is requesting that the patient have a clinic appointment and not a virtual visit.

## 2023-09-18 ASSESSMENT — HEADACHE IMPACT TEST (HIT 6)
WHEN YOU HAVE A HEADACHE HOW OFTEN DO YOU WISH YOU COULD LIE DOWN: ALWAYS
HOW OFTEN DID HEADACHS LIMIT CONCENTRATION ON WORK OR DAILY ACTIVITY: SOMETIMES
HOW OFTEN HAVE YOU FELT FED UP OR IRRITATED BECAUSE OF YOUR HEADACHES: SOMETIMES
HIT6 TOTAL SCORE: 62
HOW OFTEN DO HEADACHES LIMIT YOUR DAILY ACTIVITIES: SOMETIMES
WHEN YOU HAVE HEADACHES HOW OFTEN IS THE PAIN SEVERE: VERY OFTEN
HOW OFTEN HAVE YOU FELT TOO TIRED TO WORK BECAUSE OF YOUR HEADACHES: RARELY

## 2023-09-21 NOTE — PROGRESS NOTES
ESTABLISHED PATIENT NEUROLOGY NOTE    DATE OF VISIT: 9/22/2023  CLINIC LOCATION: [facility]  MRN: [id number]  PATIENT NAME: Damari Villalta  YOB: 1985    REASON FOR VISIT: No chief complaint on file.    SUBJECTIVE:                                                      HISTORY OF PRESENT ILLNESS: Patient is here to follow up regarding multifactorial headaches.  The last visit was on 8/11/2022.  Please refer to my initial/other prior notes for further information.    Since the last visit, the patient reports that her headaches are stable.  We reviewed her headache diary together.  She averages 2 headaches per month.  She had 1 headache in April, 2 - in May, 1 - in June, 2 in July, and 3 - in August.  No headaches in September so far.  For acute therapy she uses Imitrex with good results.  No preventative therapy.  No interval development of new neurological symptoms.  EXAM:                                                    Physical Exam:   Vitals: There were no vitals taken for this visit.    General: pt is in NAD, cooperative.  Skin: normal turgor, moist mucous membranes, no lesions/rashes noticed.  HEENT: ATNC, white sclera, normal conjunctiva.  Respiratory: Symmetric lung excursion, no accessory respiratory muscle use.  Abdomen: Non distended.  Neurological: awake, cooperative, follows commands, no aphasia or dysarthria noted, cranial nerves II-XII: no ptosis, face is symmetric, equally moves all extremities, no dysmetria bilaterally, casual gait is normal.  ASSESSMENT AND PLAN:                                                    Assessment: 37 year old female patient presents for follow-up of migraine and tension headaches.  She reports that her headaches are stable on current therapy.  She averages 2 headaches per month, and for that reason no need for preventive therapy at this time.  Imitrex appears to be working well.  I will renew the prescription.    Diagnoses:     ICD-10-CM    1. Migraine without aura and without status migrainosus, not intractable  G43.009       2. Tension headache  G44.209         Plan:  There are no Patient Instructions on file for this visit.    Total Time: *** minutes spent on the date of the encounter doing chart review, history and exam, documentation and further activities per the note.    Santi Hess MD  [facility] Neurology  (Chart documentation was completed in part with Dragon voice-recognition software. Even though reviewed, some grammatical, spelling, and word errors may remain.)

## 2023-09-22 ENCOUNTER — OFFICE VISIT (OUTPATIENT)
Dept: NEUROLOGY | Facility: CLINIC | Age: 38
End: 2023-09-22
Payer: COMMERCIAL

## 2023-09-22 VITALS
BODY MASS INDEX: 21.17 KG/M2 | WEIGHT: 124 LBS | SYSTOLIC BLOOD PRESSURE: 118 MMHG | DIASTOLIC BLOOD PRESSURE: 71 MMHG | HEART RATE: 48 BPM | HEIGHT: 64 IN | OXYGEN SATURATION: 99 %

## 2023-09-22 DIAGNOSIS — G43.009 MIGRAINE WITHOUT AURA AND WITHOUT STATUS MIGRAINOSUS, NOT INTRACTABLE: Primary | ICD-10-CM

## 2023-09-22 DIAGNOSIS — G44.209 TENSION HEADACHE: ICD-10-CM

## 2023-09-22 PROCEDURE — 99214 OFFICE O/P EST MOD 30 MIN: CPT | Performed by: PSYCHIATRY & NEUROLOGY

## 2023-09-22 RX ORDER — SUMATRIPTAN 50 MG/1
50 TABLET, FILM COATED ORAL
Qty: 9 TABLET | Refills: 6 | Status: SHIPPED | OUTPATIENT
Start: 2023-09-22 | End: 2024-08-21

## 2023-09-22 NOTE — NURSING NOTE
"Damari Villalta is a 38 year old female who presents for:  Chief Complaint   Patient presents with    RECHECK     F/U migraines - less frequent        Initial Vitals:  /71 (BP Location: Right arm, Patient Position: Sitting, Cuff Size: Adult Regular)   Pulse (!) 48   Ht 1.626 m (5' 4\")   Wt 56.2 kg (124 lb)   SpO2 99%   BMI 21.28 kg/m   Estimated body mass index is 21.28 kg/m  as calculated from the following:    Height as of this encounter: 1.626 m (5' 4\").    Weight as of this encounter: 56.2 kg (124 lb).. Body surface area is 1.59 meters squared. BP completed using cuff size: regular    Mike Reed    "

## 2023-09-22 NOTE — PATIENT INSTRUCTIONS
AFTER VISIT SUMMARY (AVS):    At today's visit we thoroughly discussed current symptoms, evaluation results, diagnosis, available treatment options, and the plan.  I am pleased to hear that your headaches remain well controlled at the present time.    We decided not to make any medication changes.  Imitrex prescription was refilled.    Next follow-up appointment is in the next 1 year or earlier if needed.    Please do not hesitate to call me with any questions or concerns.    Thanks.

## 2023-09-22 NOTE — PROGRESS NOTES
"ESTABLISHED PATIENT NEUROLOGY NOTE    DATE OF VISIT: 9/22/2023  CLINIC LOCATION: [facility]  MRN: [id number]  PATIENT NAME: Damari Villalta  YOB: 1985    REASON FOR VISIT:   Chief Complaint   Patient presents with    RECHECK     F/U migraines - less frequent     SUBJECTIVE:                                                      HISTORY OF PRESENT ILLNESS: Patient is here to follow up regarding multifactorial headaches.  The last visit was on 8/11/2022.  Please refer to my initial/other prior notes for further information.    Since the last visit, the patient reports that her headaches are stable.  We reviewed her headache diary together.  She averages 2 headaches per month.  She had 1 headache in April, 2 - in May, 1 - in June, 2 in July, and 3 - in August.  No headaches in September so far.  For acute therapy she uses Imitrex and naproxen (over-the-counter) with good results.  No preventative therapy.  No interval development of new neurological symptoms.  EXAM:                                                    Physical Exam:   Vitals: /71 (BP Location: Right arm, Patient Position: Sitting, Cuff Size: Adult Regular)   Pulse (!) 48   Ht 1.626 m (5' 4\")   Wt 56.2 kg (124 lb)   SpO2 99%   BMI 21.28 kg/m      General: pt is in NAD, cooperative.  Skin: normal turgor, moist mucous membranes, no lesions/rashes noticed.  HEENT: ATNC, white sclera, normal conjunctiva.  Respiratory: Symmetric lung excursion, no accessory respiratory muscle use.  Abdomen: Non distended.  Neurological: awake, cooperative, follows commands, no aphasia or dysarthria noted, cranial nerves II-XII: no ptosis, face is symmetric, equally moves all extremities, no dysmetria bilaterally, casual gait is normal.  ASSESSMENT AND PLAN:                                                    Assessment: 37 year old female patient presents for follow-up of migraine and tension headaches.  She reports that her " headaches are stable on current therapy.  She averages 2 headaches per month, and for that reason no need for preventive therapy at this time.  Imitrex appears to be working well.  I will renew the prescription.    Diagnoses:    ICD-10-CM    1. Migraine without aura and without status migrainosus, not intractable  G43.009       2. Tension headache  G44.209         Plan: At today's visit we thoroughly discussed current symptoms, evaluation results, diagnosis, available treatment options, and the plan.  I am pleased to hear that patient's headaches remain well controlled at the present time.    We decided not to make any medication changes.  Imitrex prescription was refilled.    Next follow-up appointment is in the next 1 year or earlier if needed.    Total Time: 12 minutes spent on the date of the encounter doing chart review, history and exam, documentation and further activities per the note.    Santi Hess MD  [facility]  (Chart documentation was completed in part with Dragon voice-recognition software. Even though reviewed, some grammatical, spelling, and word errors may remain.)

## 2023-09-22 NOTE — LETTER
"    9/22/2023         RE: Damari Villalta  [address]  [place] [address]        Dear Colleague,    Thank you for referring your patient, Damari Villalta, to the [facility] NEUROLOGY CLINICS [facility]. Please see a copy of my visit note below.    ESTABLISHED PATIENT NEUROLOGY NOTE    DATE OF VISIT: 9/22/2023  CLINIC LOCATION: [facility]  MRN: [id number]  PATIENT NAME: Damari Villalta  YOB: 1985    REASON FOR VISIT:   Chief Complaint   Patient presents with     RECHECK     F/U migraines - less frequent     SUBJECTIVE:                                                      HISTORY OF PRESENT ILLNESS: Patient is here to follow up regarding multifactorial headaches.  The last visit was on 8/11/2022.  Please refer to my initial/other prior notes for further information.    Since the last visit, the patient reports that her headaches are stable.  We reviewed her headache diary together.  She averages 2 headaches per month.  She had 1 headache in April, 2 - in May, 1 - in June, 2 in July, and 3 - in August.  No headaches in September so far.  For acute therapy she uses Imitrex and naproxen (over-the-counter) with good results.  No preventative therapy.  No interval development of new neurological symptoms.  EXAM:                                                    Physical Exam:   Vitals: /71 (BP Location: Right arm, Patient Position: Sitting, Cuff Size: Adult Regular)   Pulse (!) 48   Ht 1.626 m (5' 4\")   Wt 56.2 kg (124 lb)   SpO2 99%   BMI 21.28 kg/m      General: pt is in NAD, cooperative.  Skin: normal turgor, moist mucous membranes, no lesions/rashes noticed.  HEENT: ATNC, white sclera, normal conjunctiva.  Respiratory: Symmetric lung excursion, no accessory respiratory muscle use.  Abdomen: Non distended.  Neurological: awake, cooperative, follows commands, no aphasia or dysarthria noted, cranial nerves II-XII: no ptosis, face is symmetric, equally " moves all extremities, no dysmetria bilaterally, casual gait is normal.  ASSESSMENT AND PLAN:                                                    Assessment: 37 year old female patient presents for follow-up of migraine and tension headaches.  She reports that her headaches are stable on current therapy.  She averages 2 headaches per month, and for that reason no need for preventive therapy at this time.  Imitrex appears to be working well.  I will renew the prescription.    Diagnoses:    ICD-10-CM    1. Migraine without aura and without status migrainosus, not intractable  G43.009       2. Tension headache  G44.209         Plan: At today's visit we thoroughly discussed current symptoms, evaluation results, diagnosis, available treatment options, and the plan.  I am pleased to hear that patient's headaches remain well controlled at the present time.    We decided not to make any medication changes.  Imitrex prescription was refilled.    Next follow-up appointment is in the next 1 year or earlier if needed.    Total Time: 12 minutes spent on the date of the encounter doing chart review, history and exam, documentation and further activities per the note.    Santi Hess MD  [facility]  (Chart documentation was completed in part with Dragon voice-recognition software. Even though reviewed, some grammatical, spelling, and word errors may remain.)      Again, thank you for allowing me to participate in the care of your patient.        Sincerely,        Santi eHss MD

## 2024-08-03 ENCOUNTER — HEALTH MAINTENANCE LETTER (OUTPATIENT)
Age: 39
End: 2024-08-03

## 2024-08-20 NOTE — PROGRESS NOTES
ESTABLISHED PATIENT NEUROLOGY NOTE    DATE OF VISIT: 8/21/2024  CLINIC LOCATION: [facility]  MRN: [id number]  PATIENT NAME: Damari Villalta  YOB: 1985    REASON FOR VISIT: No chief complaint on file.    SUBJECTIVE:                                                      HISTORY OF PRESENT ILLNESS: Patient is here to follow up regarding multifactorial headaches.  She was last seen on 9/22/2023.  Please refer to my initial/other prior notes for further information.    Since the last visit, the patient reports ***.  For acute therapy she uses Imitrex and naproxen with good results.  She is not on any preventive therapy.  She denies interval development of new neurological symptoms.  EXAM:                                                    Physical Exam:   Vitals: There were no vitals taken for this visit.    General: pt is in NAD, cooperative.  Skin: normal turgor, moist mucous membranes, no lesions/rashes noticed.  HEENT: ATNC, white sclera, normal conjunctiva.  Respiratory: Symmetric lung excursion, no accessory respiratory muscle use.  Abdomen: Non distended.  Neurological: awake, cooperative, follows commands, no exam changes compared to the previous visits.  ASSESSMENT AND PLAN:                                                    Assessment: 38 year old female patient presents for follow-up of migraine and tension headaches.  She reports ***.    Diagnoses:    ICD-10-CM    1. Migraine without aura and without status migrainosus, not intractable  G43.009       2. Tension headache  G44.209         Plan:  There are no Patient Instructions on file for this visit.    Total Time: *** minutes spent on the date of the encounter doing chart review, history and exam, documentation and further activities per the note.    Santi Hess MD  [facility]  (Chart documentation was completed in part with Dragon voice-recognition software. Even though reviewed, some  grammatical, spelling, and word errors may remain.)

## 2024-08-21 ENCOUNTER — OFFICE VISIT (OUTPATIENT)
Dept: NEUROLOGY | Facility: CLINIC | Age: 39
End: 2024-08-21
Payer: COMMERCIAL

## 2024-08-21 VITALS
DIASTOLIC BLOOD PRESSURE: 67 MMHG | OXYGEN SATURATION: 99 % | WEIGHT: 126 LBS | SYSTOLIC BLOOD PRESSURE: 108 MMHG | BODY MASS INDEX: 21.63 KG/M2 | HEART RATE: 58 BPM

## 2024-08-21 DIAGNOSIS — G44.209 TENSION HEADACHE: ICD-10-CM

## 2024-08-21 DIAGNOSIS — G43.009 MIGRAINE WITHOUT AURA AND WITHOUT STATUS MIGRAINOSUS, NOT INTRACTABLE: Primary | ICD-10-CM

## 2024-08-21 PROCEDURE — 99214 OFFICE O/P EST MOD 30 MIN: CPT | Performed by: PSYCHIATRY & NEUROLOGY

## 2024-08-21 PROCEDURE — G2211 COMPLEX E/M VISIT ADD ON: HCPCS | Performed by: PSYCHIATRY & NEUROLOGY

## 2024-08-21 RX ORDER — SUMATRIPTAN 50 MG/1
50 TABLET, FILM COATED ORAL
Qty: 9 TABLET | Refills: 6 | Status: SHIPPED | OUTPATIENT
Start: 2024-08-21

## 2024-08-21 NOTE — LETTER
8/21/2024      Damari Villalta  [address]  [place] [address]      Dear Colleague,    Thank you for referring your patient, Damari Villalta, to the [facility]UROLOGY CLINICS [facility]. Please see a copy of my visit note below.    ESTABLISHED PATIENT NEUROLOGY NOTE    DATE OF VISIT: 8/21/2024  CLINIC LOCATION: [facility]  MRN: [id number]  PATIENT NAME: Damari Villalta  YOB: 1985    REASON FOR VISIT:   Chief Complaint   Patient presents with     RECHECK     Doing pretty well - consistent since last visit     SUBJECTIVE:                                                      HISTORY OF PRESENT ILLNESS: Patient is here to follow up regarding multifactorial headaches.  She was last seen on 9/22/2023.  Please refer to my initial/other prior notes for further information.    Since the last visit, the patient reports that she experiences 1-2 headaches per month around her periods.  For acute therapy she uses Imitrex and Tylenol with good results.  She used to take naproxen, but did not find it helpful.  She is not on any preventive therapy.  She denies interval development of new neurological symptoms.  EXAM:                                                    Physical Exam:   Vitals: /67 (BP Location: Right arm, Patient Position: Sitting, Cuff Size: Adult Regular)   Pulse 58   Wt 57.2 kg (126 lb)   SpO2 99%   BMI 21.63 kg/m      General: pt is in NAD, cooperative.  Skin: normal turgor, moist mucous membranes, no lesions/rashes noticed.  HEENT: ATNC, white sclera, normal conjunctiva.  Respiratory: Symmetric lung excursion, no accessory respiratory muscle use.  Abdomen: Non distended.  Neurological: awake, cooperative, follows commands, no exam changes compared to the previous visits.  ASSESSMENT AND PLAN:                                                    Assessment: 38 year old female patient presents for follow-up of migraine and tension headaches.   She reports that her headaches are stable.  We discussed options of preventative therapy, but decided not to make any changes.  I refilled her sumatriptan prescription for a year.    Diagnoses:    ICD-10-CM    1. Migraine without aura and without status migrainosus, not intractable  G43.009       2. Tension headache  G44.209         Plan: At today's visit we thoroughly discussed current symptoms, available treatment options, and the plan.    We decided not to make any medication changes.  I refilled her sumatriptan prescription.    I advised the patient to keep the headache diary and bring it to the next follow-up visit or upload via My Chart.     Next follow-up appointment is in the next 1 year or earlier if needed.    Total Time: 16 minutes spent on the date of the encounter doing chart review, history and exam, documentation and further activities per the note.    Santi Hess MD  [facility]  (Chart documentation was completed in part with Dragon voice-recognition software. Even though reviewed, some grammatical, spelling, and word errors may remain.)      Again, thank you for allowing me to participate in the care of your patient.        Sincerely,        Santi Hess MD

## 2024-08-21 NOTE — NURSING NOTE
"Damari Villalta is a 38 year old female who presents for:  Chief Complaint   Patient presents with    RECHECK     Doing pretty well - consistent since last visit        Initial Vitals:  /67 (BP Location: Right arm, Patient Position: Sitting, Cuff Size: Adult Regular)   Pulse 58   Wt 57.2 kg (126 lb)   SpO2 99%   BMI 21.63 kg/m   Estimated body mass index is 21.63 kg/m  as calculated from the following:    Height as of 9/22/23: 1.626 m (5' 4\").    Weight as of this encounter: 57.2 kg (126 lb).. Body surface area is 1.61 meters squared. BP completed using cuff size: regular    Mike Reed    "

## 2024-08-21 NOTE — PATIENT INSTRUCTIONS
AFTER VISIT SUMMARY (AVS):    At today's visit we thoroughly discussed current symptoms, available treatment options, and the plan.    We decided not to make any medication changes.  I refilled your sumatriptan prescription.    Please keep the headache diary and bring it to the next follow-up visit or upload via My Chart.     Next follow-up appointment is in the next 1 year or earlier if needed.    Please do not hesitate to call me with any questions or concerns.    Thanks.

## 2024-08-21 NOTE — PROGRESS NOTES
ESTABLISHED PATIENT NEUROLOGY NOTE    DATE OF VISIT: 8/21/2024  CLINIC LOCATION: [facility]  MRN: [id number]  PATIENT NAME: Damari Villalta  YOB: 1985    REASON FOR VISIT:   Chief Complaint   Patient presents with    RECHECK     Doing pretty well - consistent since last visit     SUBJECTIVE:                                                      HISTORY OF PRESENT ILLNESS: Patient is here to follow up regarding multifactorial headaches.  She was last seen on 9/22/2023.  Please refer to my initial/other prior notes for further information.    Since the last visit, the patient reports that she experiences 1-2 headaches per month around her periods.  For acute therapy she uses Imitrex and Tylenol with good results.  She used to take naproxen, but did not find it helpful.  She is not on any preventive therapy.  She denies interval development of new neurological symptoms.  EXAM:                                                    Physical Exam:   Vitals: /67 (BP Location: Right arm, Patient Position: Sitting, Cuff Size: Adult Regular)   Pulse 58   Wt 57.2 kg (126 lb)   SpO2 99%   BMI 21.63 kg/m      General: pt is in NAD, cooperative.  Skin: normal turgor, moist mucous membranes, no lesions/rashes noticed.  HEENT: ATNC, white sclera, normal conjunctiva.  Respiratory: Symmetric lung excursion, no accessory respiratory muscle use.  Abdomen: Non distended.  Neurological: awake, cooperative, follows commands, no exam changes compared to the previous visits.  ASSESSMENT AND PLAN:                                                    Assessment: 38 year old female patient presents for follow-up of migraine and tension headaches.  She reports that her headaches are stable.  We discussed options of preventative therapy, but decided not to make any changes.  I refilled her sumatriptan prescription for a year.    Diagnoses:    ICD-10-CM    1. Migraine without aura and without status  migrainosus, not intractable  G43.009       2. Tension headache  G44.209         Plan: At today's visit we thoroughly discussed current symptoms, available treatment options, and the plan.    We decided not to make any medication changes.  I refilled her sumatriptan prescription.    I advised the patient to keep the headache diary and bring it to the next follow-up visit or upload via My Chart.     Next follow-up appointment is in the next 1 year or earlier if needed.    Total Time: 16 minutes spent on the date of the encounter doing chart review, history and exam, documentation and further activities per the note.    Santi Hess MD  [facility]  (Chart documentation was completed in part with Dragon voice-recognition software. Even though reviewed, some grammatical, spelling, and word errors may remain.)

## 2024-10-12 ENCOUNTER — HEALTH MAINTENANCE LETTER (OUTPATIENT)
Age: 39
End: 2024-10-12

## 2025-07-03 ENCOUNTER — MYC REFILL (OUTPATIENT)
Dept: NEUROLOGY | Facility: CLINIC | Age: 40
End: 2025-07-03
Payer: COMMERCIAL

## 2025-07-03 DIAGNOSIS — G43.009 MIGRAINE WITHOUT AURA AND WITHOUT STATUS MIGRAINOSUS, NOT INTRACTABLE: ICD-10-CM

## 2025-07-03 RX ORDER — SUMATRIPTAN 50 MG/1
50 TABLET, FILM COATED ORAL
Qty: 9 TABLET | Refills: 0 | Status: SHIPPED | OUTPATIENT
Start: 2025-07-03

## 2025-07-03 NOTE — TELEPHONE ENCOUNTER
Prescription approved per [facility] Refill Protocol.  Leena RN, BSN  [facility]

## 2025-08-14 ENCOUNTER — OFFICE VISIT (OUTPATIENT)
Dept: NEUROLOGY | Facility: CLINIC | Age: 40
End: 2025-08-14
Payer: COMMERCIAL

## 2025-08-14 VITALS
SYSTOLIC BLOOD PRESSURE: 106 MMHG | BODY MASS INDEX: 22.69 KG/M2 | WEIGHT: 132.2 LBS | HEART RATE: 43 BPM | OXYGEN SATURATION: 100 % | DIASTOLIC BLOOD PRESSURE: 66 MMHG

## 2025-08-14 DIAGNOSIS — G44.209 TENSION HEADACHE: ICD-10-CM

## 2025-08-14 DIAGNOSIS — G43.009 MIGRAINE WITHOUT AURA AND WITHOUT STATUS MIGRAINOSUS, NOT INTRACTABLE: Primary | ICD-10-CM

## 2025-08-14 RX ORDER — SUMATRIPTAN 50 MG/1
50 TABLET, FILM COATED ORAL
Qty: 9 TABLET | Refills: 11 | Status: SHIPPED | OUTPATIENT
Start: 2025-08-14

## 2025-08-16 ENCOUNTER — HEALTH MAINTENANCE LETTER (OUTPATIENT)
Age: 40
End: 2025-08-16